# Patient Record
Sex: FEMALE | Race: WHITE | NOT HISPANIC OR LATINO | Employment: OTHER | URBAN - METROPOLITAN AREA
[De-identification: names, ages, dates, MRNs, and addresses within clinical notes are randomized per-mention and may not be internally consistent; named-entity substitution may affect disease eponyms.]

---

## 2017-03-16 ENCOUNTER — HISTORICAL (OUTPATIENT)
Dept: LAB | Facility: HOSPITAL | Age: 67
End: 2017-03-16

## 2019-05-06 ENCOUNTER — HISTORICAL (OUTPATIENT)
Dept: ADMINISTRATIVE | Facility: HOSPITAL | Age: 69
End: 2019-05-06

## 2019-05-06 LAB
ABS NEUT (OLG): 2.68 X10(3)/MCL (ref 2.1–9.2)
ALBUMIN SERPL-MCNC: 3.5 GM/DL (ref 3.4–5)
ALBUMIN/GLOB SERPL: 1.1 {RATIO}
ALP SERPL-CCNC: 88 UNIT/L (ref 38–126)
ALT SERPL-CCNC: 14 UNIT/L (ref 12–78)
AST SERPL-CCNC: 12 UNIT/L (ref 15–37)
BASOPHILS # BLD AUTO: 0 X10(3)/MCL (ref 0–0.2)
BASOPHILS NFR BLD AUTO: 0.6 %
BILIRUB SERPL-MCNC: 0.5 MG/DL (ref 0.2–1)
BILIRUBIN DIRECT+TOT PNL SERPL-MCNC: 0.1 MG/DL (ref 0–0.2)
BILIRUBIN DIRECT+TOT PNL SERPL-MCNC: 0.4 MG/DL (ref 0–0.8)
BUN SERPL-MCNC: 18 MG/DL (ref 7–18)
CALCIUM SERPL-MCNC: 8.8 MG/DL (ref 8.5–10.1)
CHLORIDE SERPL-SCNC: 106 MMOL/L (ref 98–107)
CO2 SERPL-SCNC: 27 MMOL/L (ref 21–32)
CREAT SERPL-MCNC: 1.01 MG/DL (ref 0.55–1.02)
EOSINOPHIL # BLD AUTO: 0.1 X10(3)/MCL (ref 0–0.9)
EOSINOPHIL NFR BLD AUTO: 2 %
ERYTHROCYTE [DISTWIDTH] IN BLOOD BY AUTOMATED COUNT: 14.6 % (ref 11.5–17)
GLOBULIN SER-MCNC: 3.3 GM/DL (ref 2.4–3.5)
GLUCOSE SERPL-MCNC: 89 MG/DL (ref 74–106)
HCT VFR BLD AUTO: 42.4 % (ref 37–47)
HGB BLD-MCNC: 13.5 GM/DL (ref 12–16)
LYMPHOCYTES # BLD AUTO: 1.7 X10(3)/MCL (ref 0.6–4.6)
LYMPHOCYTES NFR BLD AUTO: 34.9 %
MCH RBC QN AUTO: 29.2 PG (ref 27–31)
MCHC RBC AUTO-ENTMCNC: 31.8 GM/DL (ref 33–36)
MCV RBC AUTO: 91.8 FL (ref 80–94)
MONOCYTES # BLD AUTO: 0.4 X10(3)/MCL (ref 0.1–1.3)
MONOCYTES NFR BLD AUTO: 8.1 %
NEUTROPHILS # BLD AUTO: 2.7 X10(3)/MCL (ref 2.1–9.2)
NEUTROPHILS NFR BLD AUTO: 54.2 %
PLATELET # BLD AUTO: 268 X10(3)/MCL (ref 130–400)
PMV BLD AUTO: 9 FL (ref 9.4–12.4)
POTASSIUM SERPL-SCNC: 4.3 MMOL/L (ref 3.5–5.1)
PROT SERPL-MCNC: 6.8 GM/DL (ref 6.4–8.2)
RBC # BLD AUTO: 4.62 X10(6)/MCL (ref 4.2–5.4)
SODIUM SERPL-SCNC: 140 MMOL/L (ref 136–145)
WBC # SPEC AUTO: 5 X10(3)/MCL (ref 4.5–11.5)

## 2019-07-30 ENCOUNTER — HISTORICAL (OUTPATIENT)
Dept: ADMINISTRATIVE | Facility: HOSPITAL | Age: 69
End: 2019-07-30

## 2019-08-02 ENCOUNTER — HISTORICAL (OUTPATIENT)
Dept: RADIOLOGY | Facility: HOSPITAL | Age: 69
End: 2019-08-02

## 2019-08-06 ENCOUNTER — HISTORICAL (OUTPATIENT)
Dept: ADMINISTRATIVE | Facility: HOSPITAL | Age: 69
End: 2019-08-06

## 2019-08-06 LAB
ABS NEUT (OLG): 2.19 X10(3)/MCL (ref 2.1–9.2)
ALBUMIN SERPL-MCNC: 3.5 GM/DL (ref 3.4–5)
ALBUMIN/GLOB SERPL: 1 RATIO (ref 1.1–2)
ALP SERPL-CCNC: 95 UNIT/L (ref 38–126)
ALT SERPL-CCNC: 11 UNIT/L (ref 12–78)
AST SERPL-CCNC: 9 UNIT/L (ref 15–37)
BASOPHILS # BLD AUTO: 0 X10(3)/MCL (ref 0–0.2)
BASOPHILS NFR BLD AUTO: 0.6 %
BILIRUB SERPL-MCNC: 0.3 MG/DL (ref 0.2–1)
BILIRUBIN DIRECT+TOT PNL SERPL-MCNC: 0.1 MG/DL (ref 0–0.5)
BILIRUBIN DIRECT+TOT PNL SERPL-MCNC: 0.2 MG/DL (ref 0–0.8)
BUN SERPL-MCNC: 20 MG/DL (ref 7–18)
CALCIUM SERPL-MCNC: 9.2 MG/DL (ref 8.5–10.1)
CHLORIDE SERPL-SCNC: 109 MMOL/L (ref 98–107)
CO2 SERPL-SCNC: 26 MMOL/L (ref 21–32)
CREAT SERPL-MCNC: 0.75 MG/DL (ref 0.55–1.02)
EOSINOPHIL # BLD AUTO: 0.1 X10(3)/MCL (ref 0–0.9)
EOSINOPHIL NFR BLD AUTO: 1.2 %
ERYTHROCYTE [DISTWIDTH] IN BLOOD BY AUTOMATED COUNT: 13.1 % (ref 11.5–17)
GLOBULIN SER-MCNC: 3.6 GM/DL (ref 2.4–3.5)
GLUCOSE SERPL-MCNC: 95 MG/DL (ref 74–106)
HCT VFR BLD AUTO: 44.1 % (ref 37–47)
HGB BLD-MCNC: 13.1 GM/DL (ref 12–16)
LYMPHOCYTES # BLD AUTO: 2.3 X10(3)/MCL (ref 0.6–4.6)
LYMPHOCYTES NFR BLD AUTO: 45.7 %
MCH RBC QN AUTO: 27.1 PG (ref 27–31)
MCHC RBC AUTO-ENTMCNC: 29.7 GM/DL (ref 33–36)
MCV RBC AUTO: 91.1 FL (ref 80–94)
MONOCYTES # BLD AUTO: 0.5 X10(3)/MCL (ref 0.1–1.3)
MONOCYTES NFR BLD AUTO: 9.1 %
NEUTROPHILS # BLD AUTO: 2.2 X10(3)/MCL (ref 2.1–9.2)
NEUTROPHILS NFR BLD AUTO: 43.2 %
PLATELET # BLD AUTO: 281 X10(3)/MCL (ref 130–400)
PMV BLD AUTO: 9.7 FL (ref 9.4–12.4)
POTASSIUM SERPL-SCNC: 5 MMOL/L (ref 3.5–5.1)
PROT SERPL-MCNC: 7.1 GM/DL (ref 6.4–8.2)
RBC # BLD AUTO: 4.84 X10(6)/MCL (ref 4.2–5.4)
SODIUM SERPL-SCNC: 142 MMOL/L (ref 136–145)
WBC # SPEC AUTO: 5.1 X10(3)/MCL (ref 4.5–11.5)

## 2019-08-13 ENCOUNTER — HISTORICAL (OUTPATIENT)
Dept: INFUSION THERAPY | Facility: HOSPITAL | Age: 69
End: 2019-08-13

## 2019-09-03 ENCOUNTER — HISTORICAL (OUTPATIENT)
Dept: ADMINISTRATIVE | Facility: HOSPITAL | Age: 69
End: 2019-09-03

## 2019-09-03 LAB
BUN SERPL-MCNC: 16 MG/DL (ref 7–18)
CALCIUM SERPL-MCNC: 8.5 MG/DL (ref 8.5–10.1)
CHLORIDE SERPL-SCNC: 110 MMOL/L (ref 98–107)
CO2 SERPL-SCNC: 27 MMOL/L (ref 21–32)
CREAT SERPL-MCNC: 0.8 MG/DL (ref 0.55–1.02)
CREAT/UREA NIT SERPL: 20
ERYTHROCYTE [DISTWIDTH] IN BLOOD BY AUTOMATED COUNT: 14.6 % (ref 11.5–17)
GLUCOSE SERPL-MCNC: 115 MG/DL (ref 74–106)
HCT VFR BLD AUTO: 41.7 % (ref 37–47)
HGB BLD-MCNC: 12.3 GM/DL (ref 12–16)
MCH RBC QN AUTO: 26.3 PG (ref 27–31)
MCHC RBC AUTO-ENTMCNC: 29.5 GM/DL (ref 33–36)
MCV RBC AUTO: 89.1 FL (ref 80–94)
PLATELET # BLD AUTO: 413 X10(3)/MCL (ref 130–400)
PMV BLD AUTO: 9.3 FL (ref 9.4–12.4)
POTASSIUM SERPL-SCNC: 4.5 MMOL/L (ref 3.5–5.1)
RBC # BLD AUTO: 4.68 X10(6)/MCL (ref 4.2–5.4)
SODIUM SERPL-SCNC: 144 MMOL/L (ref 136–145)
WBC # SPEC AUTO: 5.8 X10(3)/MCL (ref 4.5–11.5)

## 2019-11-05 ENCOUNTER — HISTORICAL (OUTPATIENT)
Dept: ADMINISTRATIVE | Facility: HOSPITAL | Age: 69
End: 2019-11-05

## 2019-11-05 LAB
ABS NEUT (OLG): 3.2 X10(3)/MCL (ref 2.1–9.2)
ALBUMIN SERPL-MCNC: 3.5 GM/DL (ref 3.4–5)
ALBUMIN/GLOB SERPL: 1.1 RATIO (ref 1.1–2)
ALP SERPL-CCNC: 59 UNIT/L (ref 38–126)
ALT SERPL-CCNC: 10 UNIT/L (ref 12–78)
AST SERPL-CCNC: 11 UNIT/L (ref 15–37)
BASOPHILS # BLD AUTO: 0 X10(3)/MCL (ref 0–0.2)
BASOPHILS NFR BLD AUTO: 0.5 %
BILIRUB SERPL-MCNC: 0.3 MG/DL (ref 0.2–1)
BILIRUBIN DIRECT+TOT PNL SERPL-MCNC: 0 MG/DL (ref 0–0.5)
BILIRUBIN DIRECT+TOT PNL SERPL-MCNC: 0.3 MG/DL (ref 0–0.8)
BUN SERPL-MCNC: 14 MG/DL (ref 7–18)
CALCIUM SERPL-MCNC: 8.7 MG/DL (ref 8.5–10.1)
CHLORIDE SERPL-SCNC: 109 MMOL/L (ref 98–107)
CO2 SERPL-SCNC: 29 MMOL/L (ref 21–32)
CREAT SERPL-MCNC: 0.63 MG/DL (ref 0.55–1.02)
EOSINOPHIL # BLD AUTO: 0 X10(3)/MCL (ref 0–0.9)
EOSINOPHIL NFR BLD AUTO: 0.7 %
ERYTHROCYTE [DISTWIDTH] IN BLOOD BY AUTOMATED COUNT: 17.2 % (ref 11.5–17)
GLOBULIN SER-MCNC: 3.1 GM/DL (ref 2.4–3.5)
GLUCOSE SERPL-MCNC: 92 MG/DL (ref 74–106)
HCT VFR BLD AUTO: 43.2 % (ref 37–47)
HGB BLD-MCNC: 13.4 GM/DL (ref 12–16)
LYMPHOCYTES # BLD AUTO: 2 X10(3)/MCL (ref 0.6–4.6)
LYMPHOCYTES NFR BLD AUTO: 34.7 %
MCH RBC QN AUTO: 28.2 PG (ref 27–31)
MCHC RBC AUTO-ENTMCNC: 31 GM/DL (ref 33–36)
MCV RBC AUTO: 90.9 FL (ref 80–94)
MONOCYTES # BLD AUTO: 0.5 X10(3)/MCL (ref 0.1–1.3)
MONOCYTES NFR BLD AUTO: 8.1 %
NEUTROPHILS # BLD AUTO: 3.2 X10(3)/MCL (ref 2.1–9.2)
NEUTROPHILS NFR BLD AUTO: 55.5 %
PLATELET # BLD AUTO: 301 X10(3)/MCL (ref 130–400)
PMV BLD AUTO: 9 FL (ref 9.4–12.4)
POTASSIUM SERPL-SCNC: 5.6 MMOL/L (ref 3.5–5.1)
PROT SERPL-MCNC: 6.6 GM/DL (ref 6.4–8.2)
RBC # BLD AUTO: 4.75 X10(6)/MCL (ref 4.2–5.4)
SODIUM SERPL-SCNC: 143 MMOL/L (ref 136–145)
WBC # SPEC AUTO: 5.8 X10(3)/MCL (ref 4.5–11.5)

## 2020-02-04 ENCOUNTER — HISTORICAL (OUTPATIENT)
Dept: ADMINISTRATIVE | Facility: HOSPITAL | Age: 70
End: 2020-02-04

## 2020-02-04 LAB
ABS NEUT (OLG): 1.88 X10(3)/MCL (ref 2.1–9.2)
ALBUMIN SERPL-MCNC: 3.9 GM/DL (ref 3.4–5)
ALP SERPL-CCNC: 68 UNIT/L (ref 38–126)
ALT SERPL-CCNC: 11 UNIT/L (ref 12–78)
ANION GAP SERPL CALC-SCNC: 12 MMOL/L
AST SERPL-CCNC: 8 UNIT/L (ref 15–37)
BASOPHILS # BLD AUTO: 0 X10(3)/MCL (ref 0–0.2)
BASOPHILS NFR BLD AUTO: 0.7 %
BILIRUB SERPL-MCNC: 0.5 MG/DL (ref 0.2–1)
BILIRUBIN DIRECT+TOT PNL SERPL-MCNC: 0.1 MG/DL (ref 0–0.2)
BILIRUBIN DIRECT+TOT PNL SERPL-MCNC: 0.4 MG/DL (ref 0–0.8)
BUN SERPL-MCNC: 16 MG/DL (ref 8–26)
CHLORIDE SERPL-SCNC: 106 MMOL/L (ref 98–109)
CREAT SERPL-MCNC: 0.6 MG/DL (ref 0.6–1.3)
EOSINOPHIL # BLD AUTO: 0.1 X10(3)/MCL (ref 0–0.9)
EOSINOPHIL NFR BLD AUTO: 1.3 %
ERYTHROCYTE [DISTWIDTH] IN BLOOD BY AUTOMATED COUNT: 13 % (ref 11.5–17)
GLUCOSE SERPL-MCNC: 89 MG/DL (ref 70–105)
HCT VFR BLD AUTO: 45.1 % (ref 37–47)
HCT VFR BLD CALC: 44 % (ref 38–51)
HGB BLD-MCNC: 14.5 GM/DL (ref 12–16)
HGB BLD-MCNC: 15 MG/DL (ref 12–17)
LIVER PROFILE INTERP: ABNORMAL
LYMPHOCYTES # BLD AUTO: 2.2 X10(3)/MCL (ref 0.6–4.6)
LYMPHOCYTES NFR BLD AUTO: 47.8 %
MCH RBC QN AUTO: 29.8 PG (ref 27–31)
MCHC RBC AUTO-ENTMCNC: 32.2 GM/DL (ref 33–36)
MCV RBC AUTO: 92.8 FL (ref 80–94)
MONOCYTES # BLD AUTO: 0.4 X10(3)/MCL (ref 0.1–1.3)
MONOCYTES NFR BLD AUTO: 8.2 %
NEUTROPHILS # BLD AUTO: 1.9 X10(3)/MCL (ref 2.1–9.2)
NEUTROPHILS NFR BLD AUTO: 41.8 %
PLATELET # BLD AUTO: 252 X10(3)/MCL (ref 130–400)
PMV BLD AUTO: 8.9 FL (ref 9.4–12.4)
POC IONIZED CALCIUM: 1.22 MMOL/L (ref 1.12–1.32)
POC TCO2: 26 MMOL/L (ref 24–29)
POTASSIUM BLD-SCNC: 4.6 MMOL/L (ref 3.5–4.9)
PROT SERPL-MCNC: 7.3 GM/DL (ref 6.4–8.2)
RBC # BLD AUTO: 4.86 X10(6)/MCL (ref 4.2–5.4)
SODIUM BLD-SCNC: 139 MMOL/L (ref 138–146)
WBC # SPEC AUTO: 4.5 X10(3)/MCL (ref 4.5–11.5)

## 2020-02-13 ENCOUNTER — HISTORICAL (OUTPATIENT)
Dept: INFUSION THERAPY | Facility: HOSPITAL | Age: 70
End: 2020-02-13

## 2020-05-19 ENCOUNTER — HISTORICAL (OUTPATIENT)
Dept: ADMINISTRATIVE | Facility: HOSPITAL | Age: 70
End: 2020-05-19

## 2020-06-30 ENCOUNTER — HISTORICAL (OUTPATIENT)
Dept: ADMINISTRATIVE | Facility: HOSPITAL | Age: 70
End: 2020-06-30

## 2020-08-14 ENCOUNTER — HISTORICAL (OUTPATIENT)
Dept: INFUSION THERAPY | Facility: HOSPITAL | Age: 70
End: 2020-08-14

## 2020-08-14 LAB
ABS NEUT (OLG): 2.31 X10(3)/MCL (ref 2.1–9.2)
ALBUMIN SERPL-MCNC: 3.6 GM/DL (ref 3.4–5)
ALBUMIN/GLOB SERPL: 1.1 RATIO (ref 1.1–2)
ALP SERPL-CCNC: 67 UNIT/L (ref 40–150)
ALT SERPL-CCNC: 6 UNIT/L (ref 0–55)
AST SERPL-CCNC: 11 UNIT/L (ref 5–34)
BASOPHILS # BLD AUTO: 0 X10(3)/MCL (ref 0–0.2)
BASOPHILS NFR BLD AUTO: 0.4 %
BILIRUB SERPL-MCNC: 0.5 MG/DL
BILIRUBIN DIRECT+TOT PNL SERPL-MCNC: 0.2 MG/DL (ref 0–0.5)
BILIRUBIN DIRECT+TOT PNL SERPL-MCNC: 0.3 MG/DL (ref 0–0.8)
BUN SERPL-MCNC: 15.2 MG/DL (ref 9.8–20.1)
CALCIUM SERPL-MCNC: 8.5 MG/DL (ref 8.4–10.2)
CHLORIDE SERPL-SCNC: 105 MMOL/L (ref 98–107)
CO2 SERPL-SCNC: 24 MMOL/L (ref 23–31)
CREAT SERPL-MCNC: 0.72 MG/DL (ref 0.55–1.02)
EOSINOPHIL # BLD AUTO: 0.1 X10(3)/MCL (ref 0–0.9)
EOSINOPHIL NFR BLD AUTO: 1.4 %
ERYTHROCYTE [DISTWIDTH] IN BLOOD BY AUTOMATED COUNT: 13.1 % (ref 11.5–17)
GLOBULIN SER-MCNC: 3.2 GM/DL (ref 2.4–3.5)
GLUCOSE SERPL-MCNC: 84 MG/DL (ref 82–115)
HCT VFR BLD AUTO: 43.4 % (ref 37–47)
HGB BLD-MCNC: 13.5 GM/DL (ref 12–16)
LYMPHOCYTES # BLD AUTO: 2 X10(3)/MCL (ref 0.6–4.6)
LYMPHOCYTES NFR BLD AUTO: 41.6 %
MCH RBC QN AUTO: 29.3 PG (ref 27–31)
MCHC RBC AUTO-ENTMCNC: 31.1 GM/DL (ref 33–36)
MCV RBC AUTO: 94.1 FL (ref 80–94)
MONOCYTES # BLD AUTO: 0.4 X10(3)/MCL (ref 0.1–1.3)
MONOCYTES NFR BLD AUTO: 8.5 %
NEUTROPHILS # BLD AUTO: 2.3 X10(3)/MCL (ref 2.1–9.2)
NEUTROPHILS NFR BLD AUTO: 47.9 %
PLATELET # BLD AUTO: 270 X10(3)/MCL (ref 130–400)
PMV BLD AUTO: 8.9 FL (ref 9.4–12.4)
POTASSIUM SERPL-SCNC: 4.7 MMOL/L (ref 3.5–5.1)
PROT SERPL-MCNC: 6.8 GM/DL (ref 5.8–7.6)
RBC # BLD AUTO: 4.61 X10(6)/MCL (ref 4.2–5.4)
SODIUM SERPL-SCNC: 141 MMOL/L (ref 136–145)
WBC # SPEC AUTO: 4.8 X10(3)/MCL (ref 4.5–11.5)

## 2020-09-29 ENCOUNTER — HISTORICAL (OUTPATIENT)
Dept: ADMINISTRATIVE | Facility: HOSPITAL | Age: 70
End: 2020-09-29

## 2020-11-18 ENCOUNTER — HISTORICAL (OUTPATIENT)
Dept: HEMATOLOGY/ONCOLOGY | Facility: CLINIC | Age: 70
End: 2020-11-18

## 2020-11-18 LAB
ABS NEUT (OLG): 2.53 X10(3)/MCL (ref 2.1–9.2)
ALBUMIN SERPL-MCNC: 3.6 GM/DL (ref 3.4–4.8)
ALP SERPL-CCNC: 70 UNIT/L (ref 40–150)
ALT SERPL-CCNC: 8 UNIT/L (ref 0–55)
ANION GAP SERPL CALC-SCNC: 17 MMOL/L
AST SERPL-CCNC: 12 UNIT/L (ref 5–34)
BASOPHILS # BLD AUTO: 0 X10(3)/MCL (ref 0–0.2)
BASOPHILS NFR BLD AUTO: 0.4 %
BILIRUB SERPL-MCNC: 0.5 MG/DL
BILIRUBIN DIRECT+TOT PNL SERPL-MCNC: 0.2 MG/DL (ref 0–0.5)
BILIRUBIN DIRECT+TOT PNL SERPL-MCNC: 0.3 MG/DL (ref 0–0.8)
BUN SERPL-MCNC: 18 MG/DL (ref 8–26)
CHLORIDE SERPL-SCNC: 104 MMOL/L (ref 98–109)
CREAT SERPL-MCNC: 0.7 MG/DL (ref 0.6–1.3)
EOSINOPHIL # BLD AUTO: 0 X10(3)/MCL (ref 0–0.9)
EOSINOPHIL NFR BLD AUTO: 1.1 %
ERYTHROCYTE [DISTWIDTH] IN BLOOD BY AUTOMATED COUNT: 13 % (ref 11.5–17)
GLUCOSE SERPL-MCNC: 127 MG/DL (ref 70–105)
HCT VFR BLD AUTO: 44.2 % (ref 37–47)
HCT VFR BLD CALC: 41 % (ref 38–51)
HGB BLD-MCNC: 13.9 MG/DL (ref 12–17)
HGB BLD-MCNC: 14.3 GM/DL (ref 12–16)
LIVER PROFILE INTERP: NORMAL
LYMPHOCYTES # BLD AUTO: 1.5 X10(3)/MCL (ref 0.6–4.6)
LYMPHOCYTES NFR BLD AUTO: 34.2 %
MCH RBC QN AUTO: 29.5 PG (ref 27–31)
MCHC RBC AUTO-ENTMCNC: 32.4 GM/DL (ref 33–36)
MCV RBC AUTO: 91.1 FL (ref 80–94)
MONOCYTES # BLD AUTO: 0.4 X10(3)/MCL (ref 0.1–1.3)
MONOCYTES NFR BLD AUTO: 7.8 %
NEUTROPHILS # BLD AUTO: 2.5 X10(3)/MCL (ref 2.1–9.2)
NEUTROPHILS NFR BLD AUTO: 56.3 %
PLATELET # BLD AUTO: 273 X10(3)/MCL (ref 130–400)
PMV BLD AUTO: 8.9 FL (ref 9.4–12.4)
POC IONIZED CALCIUM: 1.14 MMOL/L (ref 1.12–1.32)
POC TCO2: 27 MMOL/L (ref 24–29)
POTASSIUM BLD-SCNC: 4.6 MMOL/L (ref 3.5–4.9)
PROT SERPL-MCNC: 7.3 GM/DL (ref 5.8–7.6)
RBC # BLD AUTO: 4.85 X10(6)/MCL (ref 4.2–5.4)
SODIUM BLD-SCNC: 143 MMOL/L (ref 138–146)
WBC # SPEC AUTO: 4.5 X10(3)/MCL (ref 4.5–11.5)

## 2021-02-24 ENCOUNTER — HISTORICAL (OUTPATIENT)
Dept: ADMINISTRATIVE | Facility: HOSPITAL | Age: 71
End: 2021-02-24

## 2021-02-24 LAB
ABS NEUT (OLG): 2.95 X10(3)/MCL (ref 2.1–9.2)
ALBUMIN SERPL-MCNC: 3.4 GM/DL (ref 3.4–4.8)
ALBUMIN/GLOB SERPL: 1.1 RATIO (ref 1.1–2)
ALP SERPL-CCNC: 77 UNIT/L (ref 40–150)
ALT SERPL-CCNC: 7 UNIT/L (ref 0–55)
AST SERPL-CCNC: 11 UNIT/L (ref 5–34)
BASOPHILS # BLD AUTO: 0 X10(3)/MCL (ref 0–0.2)
BASOPHILS NFR BLD AUTO: 0.6 %
BILIRUB SERPL-MCNC: 0.3 MG/DL
BILIRUBIN DIRECT+TOT PNL SERPL-MCNC: 0.1 MG/DL (ref 0–0.5)
BILIRUBIN DIRECT+TOT PNL SERPL-MCNC: 0.2 MG/DL (ref 0–0.8)
BUN SERPL-MCNC: 18.5 MG/DL (ref 9.8–20.1)
CALCIUM SERPL-MCNC: 8.9 MG/DL (ref 8.4–10.2)
CHLORIDE SERPL-SCNC: 104 MMOL/L (ref 98–107)
CO2 SERPL-SCNC: 29 MMOL/L (ref 23–31)
CREAT SERPL-MCNC: 0.8 MG/DL (ref 0.55–1.02)
EOSINOPHIL # BLD AUTO: 0.1 X10(3)/MCL (ref 0–0.9)
EOSINOPHIL NFR BLD AUTO: 2.5 %
ERYTHROCYTE [DISTWIDTH] IN BLOOD BY AUTOMATED COUNT: 12.9 % (ref 11.5–17)
GLOBULIN SER-MCNC: 3.2 GM/DL (ref 2.4–3.5)
GLUCOSE SERPL-MCNC: 108 MG/DL (ref 82–115)
HCT VFR BLD AUTO: 43.1 % (ref 37–47)
HGB BLD-MCNC: 13.7 GM/DL (ref 12–16)
LYMPHOCYTES # BLD AUTO: 1.6 X10(3)/MCL (ref 0.6–4.6)
LYMPHOCYTES NFR BLD AUTO: 31.3 %
MCH RBC QN AUTO: 29.7 PG (ref 27–31)
MCHC RBC AUTO-ENTMCNC: 31.8 GM/DL (ref 33–36)
MCV RBC AUTO: 93.5 FL (ref 80–94)
MONOCYTES # BLD AUTO: 0.4 X10(3)/MCL (ref 0.1–1.3)
MONOCYTES NFR BLD AUTO: 8 %
NEUTROPHILS # BLD AUTO: 3 X10(3)/MCL (ref 2.1–9.2)
NEUTROPHILS NFR BLD AUTO: 57.2 %
PLATELET # BLD AUTO: 241 X10(3)/MCL (ref 130–400)
PMV BLD AUTO: 9 FL (ref 9.4–12.4)
POTASSIUM SERPL-SCNC: 4.7 MMOL/L (ref 3.5–5.1)
PROT SERPL-MCNC: 6.6 GM/DL (ref 5.8–7.6)
RBC # BLD AUTO: 4.61 X10(6)/MCL (ref 4.2–5.4)
SODIUM SERPL-SCNC: 140 MMOL/L (ref 136–145)
WBC # SPEC AUTO: 5.2 X10(3)/MCL (ref 4.5–11.5)

## 2021-03-08 ENCOUNTER — HISTORICAL (OUTPATIENT)
Dept: INFUSION THERAPY | Facility: HOSPITAL | Age: 71
End: 2021-03-08

## 2021-08-19 ENCOUNTER — HISTORICAL (OUTPATIENT)
Dept: ADMINISTRATIVE | Facility: HOSPITAL | Age: 71
End: 2021-08-19

## 2021-09-13 ENCOUNTER — HISTORICAL (OUTPATIENT)
Dept: HEMATOLOGY/ONCOLOGY | Facility: CLINIC | Age: 71
End: 2021-09-13

## 2021-09-13 LAB
ABS NEUT (OLG): 2.37 X10(3)/MCL (ref 2.1–9.2)
ALBUMIN SERPL-MCNC: 3.7 GM/DL (ref 3.4–4.8)
ALBUMIN/GLOB SERPL: 1.2 RATIO (ref 1.1–2)
ALP SERPL-CCNC: 73 UNIT/L (ref 40–150)
ALT SERPL-CCNC: 6 UNIT/L (ref 0–55)
AST SERPL-CCNC: 14 UNIT/L (ref 5–34)
BASOPHILS # BLD AUTO: 0 X10(3)/MCL (ref 0–0.2)
BASOPHILS NFR BLD AUTO: 0.4 %
BILIRUB SERPL-MCNC: 0.4 MG/DL
BILIRUBIN DIRECT+TOT PNL SERPL-MCNC: 0.2 MG/DL (ref 0–0.5)
BILIRUBIN DIRECT+TOT PNL SERPL-MCNC: 0.2 MG/DL (ref 0–0.8)
BUN SERPL-MCNC: 18.4 MG/DL (ref 9.8–20.1)
CALCIUM SERPL-MCNC: 9.1 MG/DL (ref 8.4–10.2)
CHLORIDE SERPL-SCNC: 105 MMOL/L (ref 98–107)
CO2 SERPL-SCNC: 27 MMOL/L (ref 23–31)
CREAT SERPL-MCNC: 0.75 MG/DL (ref 0.55–1.02)
EOSINOPHIL # BLD AUTO: 0.1 X10(3)/MCL (ref 0–0.9)
EOSINOPHIL NFR BLD AUTO: 1.5 %
ERYTHROCYTE [DISTWIDTH] IN BLOOD BY AUTOMATED COUNT: 13.2 % (ref 11.5–17)
GLOBULIN SER-MCNC: 3 GM/DL (ref 2.4–3.5)
GLUCOSE SERPL-MCNC: 95 MG/DL (ref 82–115)
HCT VFR BLD AUTO: 43.5 % (ref 37–47)
HGB BLD-MCNC: 13.8 GM/DL (ref 12–16)
LYMPHOCYTES # BLD AUTO: 1.8 X10(3)/MCL (ref 0.6–4.6)
LYMPHOCYTES NFR BLD AUTO: 38.1 %
MCH RBC QN AUTO: 29.7 PG (ref 27–31)
MCHC RBC AUTO-ENTMCNC: 31.7 GM/DL (ref 33–36)
MCV RBC AUTO: 93.8 FL (ref 80–94)
MONOCYTES # BLD AUTO: 0.4 X10(3)/MCL (ref 0.1–1.3)
MONOCYTES NFR BLD AUTO: 9 %
NEUTROPHILS # BLD AUTO: 2.4 X10(3)/MCL (ref 2.1–9.2)
NEUTROPHILS NFR BLD AUTO: 50.8 %
PLATELET # BLD AUTO: 244 X10(3)/MCL (ref 130–400)
PMV BLD AUTO: 9.2 FL (ref 9.4–12.4)
POTASSIUM SERPL-SCNC: 5.4 MMOL/L (ref 3.5–5.1)
PROT SERPL-MCNC: 6.7 GM/DL (ref 5.8–7.6)
RBC # BLD AUTO: 4.64 X10(6)/MCL (ref 4.2–5.4)
SODIUM SERPL-SCNC: 140 MMOL/L (ref 136–145)
WBC # SPEC AUTO: 4.7 X10(3)/MCL (ref 4.5–11.5)

## 2021-09-27 ENCOUNTER — HISTORICAL (OUTPATIENT)
Dept: INFUSION THERAPY | Facility: HOSPITAL | Age: 71
End: 2021-09-27

## 2021-09-27 ENCOUNTER — HISTORICAL (OUTPATIENT)
Dept: RADIOLOGY | Facility: HOSPITAL | Age: 71
End: 2021-09-27

## 2022-03-28 ENCOUNTER — HISTORICAL (OUTPATIENT)
Dept: INFUSION THERAPY | Facility: HOSPITAL | Age: 72
End: 2022-03-28

## 2022-04-14 ENCOUNTER — HISTORICAL (OUTPATIENT)
Dept: ADMINISTRATIVE | Facility: HOSPITAL | Age: 72
End: 2022-04-14

## 2022-04-14 LAB
ABS NEUT (OLG): 2.73 (ref 2.1–9.2)
ALBUMIN SERPL-MCNC: 3.4 G/DL (ref 3.4–4.8)
ALBUMIN/GLOB SERPL: 1.1 {RATIO} (ref 1.1–2)
ALP SERPL-CCNC: 76 U/L (ref 40–150)
ALT SERPL-CCNC: 8 U/L (ref 0–55)
AST SERPL-CCNC: 13 U/L (ref 5–34)
BASOPHILS # BLD AUTO: 0 10*3/UL (ref 0–0.2)
BASOPHILS NFR BLD AUTO: 0.4 %
BILIRUB SERPL-MCNC: 0.5 MG/DL
BILIRUBIN DIRECT+TOT PNL SERPL-MCNC: 0.2 (ref 0–0.5)
BILIRUBIN DIRECT+TOT PNL SERPL-MCNC: 0.3 (ref 0–0.8)
BUN SERPL-MCNC: 14.4 MG/DL (ref 9.8–20.1)
CALCIUM SERPL-MCNC: 9 MG/DL (ref 8.7–10.5)
CHLORIDE SERPL-SCNC: 108 MMOL/L (ref 98–107)
CO2 SERPL-SCNC: 25 MMOL/L (ref 23–31)
CREAT SERPL-MCNC: 0.78 MG/DL (ref 0.55–1.02)
EOSINOPHIL # BLD AUTO: 0.1 10*3/UL (ref 0–0.9)
EOSINOPHIL NFR BLD AUTO: 2.3 %
ERYTHROCYTE [DISTWIDTH] IN BLOOD BY AUTOMATED COUNT: 12.9 % (ref 11.5–17)
GLOBULIN SER-MCNC: 3.2 G/DL (ref 2.4–3.5)
GLUCOSE SERPL-MCNC: 85 MG/DL (ref 82–115)
HCT VFR BLD AUTO: 42.8 % (ref 37–47)
HEMOLYSIS INTERF INDEX SERPL-ACNC: 18
HGB BLD-MCNC: 13.4 G/DL (ref 12–16)
ICTERIC INTERF INDEX SERPL-ACNC: 1
LIPEMIC INTERF INDEX SERPL-ACNC: 8
LYMPHOCYTES # BLD AUTO: 1.9 10*3/UL (ref 0.6–4.6)
LYMPHOCYTES NFR BLD AUTO: 36 %
MANUAL DIFF? (OHS): NO
MCH RBC QN AUTO: 29.6 PG (ref 27–31)
MCHC RBC AUTO-ENTMCNC: 31.3 G/DL (ref 33–36)
MCV RBC AUTO: 94.7 FL (ref 80–94)
MONOCYTES # BLD AUTO: 0.5 10*3/UL (ref 0.1–1.3)
MONOCYTES NFR BLD AUTO: 9 %
NEUTROPHILS # BLD AUTO: 2.7 10*3/UL (ref 2.1–9.2)
NEUTROPHILS NFR BLD AUTO: 51.9 %
PLATELET # BLD AUTO: 248 10*3/UL (ref 130–400)
PMV BLD AUTO: 9.4 FL (ref 9.4–12.4)
POTASSIUM SERPL-SCNC: 4.7 MMOL/L (ref 3.5–5.1)
PROT SERPL-MCNC: 6.6 G/DL (ref 5.8–7.6)
RBC # BLD AUTO: 4.52 10*6/UL (ref 4.2–5.4)
SODIUM SERPL-SCNC: 140 MMOL/L (ref 136–145)
WBC # SPEC AUTO: 5.2 10*3/UL (ref 4.5–11.5)

## 2022-06-30 ENCOUNTER — OFFICE VISIT (OUTPATIENT)
Dept: ORTHOPEDICS | Facility: CLINIC | Age: 72
End: 2022-06-30
Payer: MEDICARE

## 2022-06-30 ENCOUNTER — HOSPITAL ENCOUNTER (OUTPATIENT)
Dept: RADIOLOGY | Facility: CLINIC | Age: 72
Discharge: HOME | End: 2022-06-30
Attending: ORTHOPAEDIC SURGERY
Payer: MEDICARE

## 2022-06-30 VITALS
SYSTOLIC BLOOD PRESSURE: 113 MMHG | DIASTOLIC BLOOD PRESSURE: 72 MMHG | HEIGHT: 69 IN | WEIGHT: 114 LBS | BODY MASS INDEX: 16.88 KG/M2 | HEART RATE: 86 BPM

## 2022-06-30 DIAGNOSIS — M16.11 PRIMARY OSTEOARTHRITIS OF RIGHT HIP: ICD-10-CM

## 2022-06-30 DIAGNOSIS — S73.191S TEAR OF RIGHT ACETABULAR LABRUM, SEQUELA: Primary | ICD-10-CM

## 2022-06-30 PROCEDURE — 3078F PR MOST RECENT DIASTOLIC BLOOD PRESSURE < 80 MM HG: ICD-10-PCS | Mod: CPTII,,, | Performed by: ORTHOPAEDIC SURGERY

## 2022-06-30 PROCEDURE — 3288F PR FALLS RISK ASSESSMENT DOCUMENTED: ICD-10-PCS | Mod: CPTII,,, | Performed by: ORTHOPAEDIC SURGERY

## 2022-06-30 PROCEDURE — 3008F BODY MASS INDEX DOCD: CPT | Mod: CPTII,,, | Performed by: ORTHOPAEDIC SURGERY

## 2022-06-30 PROCEDURE — 73502 XR HIP WITH PELVIS WHEN PERFORMED, 2 OR 3  VIEWS RIGHT: ICD-10-PCS | Mod: RT,,, | Performed by: ORTHOPAEDIC SURGERY

## 2022-06-30 PROCEDURE — 3008F PR BODY MASS INDEX (BMI) DOCUMENTED: ICD-10-PCS | Mod: CPTII,,, | Performed by: ORTHOPAEDIC SURGERY

## 2022-06-30 PROCEDURE — 1159F PR MEDICATION LIST DOCUMENTED IN MEDICAL RECORD: ICD-10-PCS | Mod: CPTII,,, | Performed by: ORTHOPAEDIC SURGERY

## 2022-06-30 PROCEDURE — 3074F SYST BP LT 130 MM HG: CPT | Mod: CPTII,,, | Performed by: ORTHOPAEDIC SURGERY

## 2022-06-30 PROCEDURE — 3078F DIAST BP <80 MM HG: CPT | Mod: CPTII,,, | Performed by: ORTHOPAEDIC SURGERY

## 2022-06-30 PROCEDURE — 99213 PR OFFICE/OUTPT VISIT, EST, LEVL III, 20-29 MIN: ICD-10-PCS | Mod: ,,, | Performed by: ORTHOPAEDIC SURGERY

## 2022-06-30 PROCEDURE — 3074F PR MOST RECENT SYSTOLIC BLOOD PRESSURE < 130 MM HG: ICD-10-PCS | Mod: CPTII,,, | Performed by: ORTHOPAEDIC SURGERY

## 2022-06-30 PROCEDURE — 99213 OFFICE O/P EST LOW 20 MIN: CPT | Mod: ,,, | Performed by: ORTHOPAEDIC SURGERY

## 2022-06-30 PROCEDURE — 3288F FALL RISK ASSESSMENT DOCD: CPT | Mod: CPTII,,, | Performed by: ORTHOPAEDIC SURGERY

## 2022-06-30 PROCEDURE — 1159F MED LIST DOCD IN RCRD: CPT | Mod: CPTII,,, | Performed by: ORTHOPAEDIC SURGERY

## 2022-06-30 PROCEDURE — 1101F PT FALLS ASSESS-DOCD LE1/YR: CPT | Mod: CPTII,,, | Performed by: ORTHOPAEDIC SURGERY

## 2022-06-30 PROCEDURE — 73502 X-RAY EXAM HIP UNI 2-3 VIEWS: CPT | Mod: RT,,, | Performed by: ORTHOPAEDIC SURGERY

## 2022-06-30 PROCEDURE — 1101F PR PT FALLS ASSESS DOC 0-1 FALLS W/OUT INJ PAST YR: ICD-10-PCS | Mod: CPTII,,, | Performed by: ORTHOPAEDIC SURGERY

## 2022-06-30 RX ORDER — AZITHROMYCIN 250 MG/1
TABLET, FILM COATED ORAL
Status: ON HOLD | COMMUNITY
Start: 2022-06-20 | End: 2022-08-03 | Stop reason: CLARIF

## 2022-06-30 RX ORDER — TRIAMTERENE AND HYDROCHLOROTHIAZIDE 37.5; 25 MG/1; MG/1
1 CAPSULE ORAL DAILY
Status: ON HOLD | COMMUNITY
Start: 2022-06-24 | End: 2022-08-03 | Stop reason: CLARIF

## 2022-06-30 RX ORDER — TRAMADOL HYDROCHLORIDE 50 MG/1
50 TABLET ORAL 4 TIMES DAILY PRN
Status: ON HOLD | COMMUNITY
Start: 2022-06-13 | End: 2022-08-04 | Stop reason: HOSPADM

## 2022-06-30 RX ORDER — LETROZOLE 2.5 MG/1
2.5 TABLET, FILM COATED ORAL NIGHTLY
COMMUNITY
Start: 2021-09-16 | End: 2023-06-12

## 2022-06-30 RX ORDER — METHYLPREDNISOLONE 4 MG/1
TABLET ORAL
Status: ON HOLD | COMMUNITY
Start: 2022-06-20 | End: 2022-08-03 | Stop reason: CLARIF

## 2022-06-30 RX ORDER — VITAMIN E 268 MG
400 CAPSULE ORAL
COMMUNITY

## 2022-06-30 RX ORDER — ROPINIROLE 1 MG/1
2 TABLET, FILM COATED ORAL NIGHTLY
COMMUNITY
Start: 2022-06-13

## 2022-06-30 RX ORDER — METOPROLOL SUCCINATE 25 MG/1
25 TABLET, EXTENDED RELEASE ORAL NIGHTLY
COMMUNITY
Start: 2022-06-06

## 2022-06-30 RX ORDER — ASPIRIN 325 MG
325 TABLET, DELAYED RELEASE (ENTERIC COATED) ORAL
Status: ON HOLD | COMMUNITY
End: 2022-08-03 | Stop reason: CLARIF

## 2022-06-30 RX ORDER — RIVAROXABAN 20 MG/1
20 TABLET, FILM COATED ORAL DAILY
COMMUNITY
Start: 2022-06-13

## 2022-06-30 RX ORDER — PREGABALIN 50 MG/1
50 CAPSULE ORAL NIGHTLY
COMMUNITY
Start: 2021-08-19 | End: 2024-01-22

## 2022-06-30 RX ORDER — BISACODYL 5 MG
10 TABLET, DELAYED RELEASE (ENTERIC COATED) ORAL DAILY PRN
COMMUNITY

## 2022-06-30 RX ORDER — AMITRIPTYLINE HYDROCHLORIDE 50 MG/1
50 TABLET, FILM COATED ORAL NIGHTLY
COMMUNITY
Start: 2022-06-17

## 2022-06-30 RX ORDER — TIZANIDINE 4 MG/1
4 TABLET ORAL NIGHTLY PRN
COMMUNITY
Start: 2022-06-13

## 2022-06-30 NOTE — PROGRESS NOTES
Past Medical History:   Diagnosis Date    A-fib     Asthma     Breast cancer     Fibromyalgia     Osteoarthritis        Past Surgical History:   Procedure Laterality Date    APPENDECTOMY      MASTECTOMY Bilateral     TOTAL HIP ARTHROPLASTY Left     PATRICK GIRON       Current Outpatient Medications   Medication Sig    amitriptyline (ELAVIL) 50 MG tablet Take 50 mg by mouth nightly.    aspirin (ECOTRIN) 325 MG EC tablet Take 325 mg by mouth.    azithromycin (Z-VANESSA) 250 MG tablet TAKE 2 TABLETS BY MOUTH TODAY, THEN TAKE 1 TABLET DAILY FOR 4 DAYS    bisacodyL (DULCOLAX) 5 mg EC tablet Take 10 mg by mouth daily as needed.    letrozole (FEMARA) 2.5 mg Tab Take 2.5 mg by mouth.    methylPREDNISolone (MEDROL DOSEPACK) 4 mg tablet TAKE 6 TABLETS ON DAY 1 AS DIRECTED ON PACKAGE AND DECREASE BY 1 TAB EACH DAY FOR A TOTAL OF 6 DAYS    metoprolol succinate (TOPROL-XL) 25 MG 24 hr tablet Take 25 mg by mouth once daily.    pregabalin (LYRICA) 50 MG capsule Take 1 capsule by mouth every evening.    rOPINIRole (REQUIP) 1 MG tablet Take 2 mg by mouth nightly.    tiZANidine (ZANAFLEX) 4 MG tablet Take 4 mg by mouth daily as needed.    traMADoL (ULTRAM) 50 mg tablet Take 50 mg by mouth 4 (four) times daily as needed.    triamterene-hydrochlorothiazide 37.5-25 mg (DYAZIDE) 37.5-25 mg per capsule Take 1 capsule by mouth once daily.    vitamin E 400 UNIT capsule Take 400 Units by mouth.    XARELTO 20 mg Tab Take 20 mg by mouth once daily.     No current facility-administered medications for this visit.       Review of patient's allergies indicates:   Allergen Reactions    Loratadine-pseudoephedrine Rash and Shortness Of Breath     Other reaction(s): hives, pruritis       No family history on file.    Social History     Socioeconomic History    Marital status:    Tobacco Use    Smoking status: Never Smoker    Smokeless tobacco: Never Used       Chief Complaint:   Chief Complaint   Patient presents with    Pain  "    R hip pain, no inj or sx, pt states pain started around after left hip sx, pt states will wake her up at night, stabbing sharp pain, pt feels like hip comes out of place, pt would like to discuss possibility of sx        History of present illness: Candace Macias is a 71 y.o. female, presents to clinic today in regards to right hip pain. This pain has been ongoing for a while. Unable to abduct the right leg without a feeling of "popping out". This affects her at night and getting and out of a car. Does have an antalgic gait due to her hip pain. She has tried OTC anti-inflammatories with minimal relief.       Review of Systems:    Denies fevers, chills, chest pain, shortness of breath. Comprehensive review of systems performed and otherwise negative except as noted in HPI     Physical Examination:    General: awake and alert, no acute distress, healthy appearing  Head and Neck: Head atraumatic/normocephalic. Moist MM  CV: brisk cap refill  Lungs: non-labored breathing, w/o cough or SOB  Skin: no rashes present, warm to touch  Neuro: sensation grossly intact distally       Vital Signs:    Vitals:    06/30/22 1554   BP: 113/72   Pulse: 86       Body mass index is 16.83 kg/m².    Focused Orthopedic Exam:    right hip without wound or skin breakdown.  minimal tenderness to palpation about the greater trochanter and gluteus  Flexion to 100. Internal Rotation to 20. External Rotation to 20. Anterior groin pain with range of motion  + Stinchfield  + antalgic gait      X-rays: 3 views of the right hip. Arthritic changes noted centrally with narrowing of the joint space.      Assessment::Primary OA R hip    Plan:  At this point the patient is tried and failed all conservative management with regards to their right hip. They have tried and failed nonoperative management including: Anti-inflammatories, activity modification, and assistive devices. All of these have failed to completely remove their pain. " They have pain putting on shoes and socks, getting in and out of a car, as well as walking on level ground. Their hip pain is affecting activities of daily living They feel that they've reached a point of disability with regards to their hip. X-rays reveal advanced, end-stage degenerative osteoarthritis as noted by subchondral sclerosis, joint space narrowing, and periarticular osteophytes. The patient would like to proceed with surgical intervention and would be a good candidate for total hip replacement.    Total hip arthroplasty procedure, alternatives, risks, and benefits were discussed in detail. The risks including but not limited to: infection, need for revision surgery, pain, swelling, loosening, injury to surrounding neurovascular structures, stiffness, incomplete resolution of pain, limb length discrepancy, DVT, PE, and death were discussed in detail. Despite these risks, the patient would like to proceed with surgical intervention. All questions were answered, no guarantees made. Will plan for right SUNIL in the beginning of August.    The above findings, diagnostics, and treatment plan were discussed with Dr. Osmany Kwon who is in agreement with the plan of care.    This note was created using [employer] voice recognition software that occasionally misinterpreted phrases or words.    Consult note is delivered via Epic messaging service.

## 2022-07-05 DIAGNOSIS — M16.11 PRIMARY OSTEOARTHRITIS OF RIGHT HIP: Primary | ICD-10-CM

## 2022-07-20 DIAGNOSIS — M85.89 OSTEOPENIA OF MULTIPLE SITES: ICD-10-CM

## 2022-07-26 ENCOUNTER — HOSPITAL ENCOUNTER (OUTPATIENT)
Dept: RADIOLOGY | Facility: HOSPITAL | Age: 72
Discharge: HOME | End: 2022-07-26
Attending: NURSE PRACTITIONER
Payer: MEDICARE

## 2022-07-26 ENCOUNTER — HOSPITAL ENCOUNTER (OUTPATIENT)
Dept: RADIOLOGY | Facility: CLINIC | Age: 72
Discharge: HOME | End: 2022-07-26
Attending: ORTHOPAEDIC SURGERY
Payer: MEDICARE

## 2022-07-26 ENCOUNTER — HOSPITAL ENCOUNTER (OUTPATIENT)
Dept: RADIOLOGY | Facility: HOSPITAL | Age: 72
Discharge: HOME | End: 2022-07-26
Attending: ORTHOPAEDIC SURGERY
Payer: MEDICARE

## 2022-07-26 ENCOUNTER — OFFICE VISIT (OUTPATIENT)
Dept: ORTHOPEDICS | Facility: CLINIC | Age: 72
End: 2022-07-26
Payer: MEDICARE

## 2022-07-26 VITALS — BODY MASS INDEX: 16.88 KG/M2 | WEIGHT: 114 LBS | HEIGHT: 69 IN

## 2022-07-26 DIAGNOSIS — M16.11 PRIMARY OSTEOARTHRITIS OF RIGHT HIP: ICD-10-CM

## 2022-07-26 DIAGNOSIS — Z01.818 PREOPERATIVE TESTING: ICD-10-CM

## 2022-07-26 DIAGNOSIS — M16.11 PRIMARY OSTEOARTHRITIS OF RIGHT HIP: Primary | ICD-10-CM

## 2022-07-26 LAB
APPEARANCE UR: CLEAR
BACTERIA #/AREA URNS AUTO: ABNORMAL /HPF
BILIRUB UR QL STRIP.AUTO: NEGATIVE MG/DL
COLOR UR AUTO: YELLOW
GLUCOSE UR QL STRIP.AUTO: NEGATIVE MG/DL
KETONES UR QL STRIP.AUTO: NEGATIVE MG/DL
LEUKOCYTE ESTERASE UR QL STRIP.AUTO: NEGATIVE UNIT/L
MRSA PCR SCRN (OHS): NOT DETECTED
MUCOUS THREADS URNS QL MICRO: ABNORMAL /LPF
NITRITE UR QL STRIP.AUTO: NEGATIVE
PH UR STRIP.AUTO: 5.5 [PH]
PROT UR QL STRIP.AUTO: NEGATIVE MG/DL
RBC #/AREA URNS AUTO: ABNORMAL /HPF
RBC UR QL AUTO: ABNORMAL UNIT/L
SP GR UR STRIP.AUTO: >=1.03
SQUAMOUS #/AREA URNS AUTO: ABNORMAL /HPF
UROBILINOGEN UR STRIP-ACNC: 0.2 MG/DL
WBC #/AREA URNS AUTO: ABNORMAL /HPF

## 2022-07-26 PROCEDURE — 72100 X-RAY EXAM L-S SPINE 2/3 VWS: CPT | Mod: ,,, | Performed by: ORTHOPAEDIC SURGERY

## 2022-07-26 PROCEDURE — 72100 XR LUMBAR SPINE AP AND LATERAL: ICD-10-PCS | Mod: ,,, | Performed by: ORTHOPAEDIC SURGERY

## 2022-07-26 PROCEDURE — 73700 CT LOWER EXTREMITY W/O DYE: CPT | Mod: TC,RT

## 2022-07-26 PROCEDURE — 99213 PR OFFICE/OUTPT VISIT, EST, LEVL III, 20-29 MIN: ICD-10-PCS | Mod: ,,, | Performed by: ORTHOPAEDIC SURGERY

## 2022-07-26 PROCEDURE — 99213 OFFICE O/P EST LOW 20 MIN: CPT | Mod: ,,, | Performed by: ORTHOPAEDIC SURGERY

## 2022-07-26 PROCEDURE — 71046 X-RAY EXAM CHEST 2 VIEWS: CPT | Mod: TC

## 2022-07-26 RX ORDER — IBUPROFEN 100 MG/5ML
1000 SUSPENSION, ORAL (FINAL DOSE FORM) ORAL DAILY
COMMUNITY

## 2022-07-26 RX ORDER — ACETAMINOPHEN 500 MG
1000 TABLET ORAL
Status: CANCELLED | OUTPATIENT
Start: 2022-07-26 | End: 2022-07-26

## 2022-07-26 RX ORDER — SODIUM CHLORIDE 9 MG/ML
INJECTION, SOLUTION INTRAVENOUS CONTINUOUS
Status: CANCELLED | OUTPATIENT
Start: 2022-07-26

## 2022-07-26 RX ORDER — TRANEXAMIC ACID 650 MG/1
1950 TABLET ORAL
Status: CANCELLED | OUTPATIENT
Start: 2022-07-26 | End: 2022-07-26

## 2022-07-26 RX ORDER — GABAPENTIN 100 MG/1
600 CAPSULE ORAL
Status: CANCELLED | OUTPATIENT
Start: 2022-07-26

## 2022-07-26 RX ORDER — SCOLOPAMINE TRANSDERMAL SYSTEM 1 MG/1
1 PATCH, EXTENDED RELEASE TRANSDERMAL
Status: CANCELLED | OUTPATIENT
Start: 2022-07-26

## 2022-07-26 RX ORDER — KETOROLAC TROMETHAMINE 30 MG/ML
15 INJECTION, SOLUTION INTRAMUSCULAR; INTRAVENOUS
Status: CANCELLED | OUTPATIENT
Start: 2022-07-26 | End: 2022-07-26

## 2022-07-26 RX ORDER — CHOLECALCIFEROL (VITAMIN D3) 25 MCG
1000 TABLET ORAL DAILY
COMMUNITY

## 2022-07-26 NOTE — PROGRESS NOTES
Past Medical History:   Diagnosis Date    A-fib     Asthma     Breast cancer     Fibromyalgia     Osteoarthritis        Past Surgical History:   Procedure Laterality Date    APPENDECTOMY      MASTECTOMY Bilateral     TOTAL HIP ARTHROPLASTY Left     PATRICK GIRON       Current Outpatient Medications   Medication Sig    amitriptyline (ELAVIL) 50 MG tablet Take 50 mg by mouth nightly.    aspirin (ECOTRIN) 325 MG EC tablet Take 325 mg by mouth.    azithromycin (Z-VANESSA) 250 MG tablet TAKE 2 TABLETS BY MOUTH TODAY, THEN TAKE 1 TABLET DAILY FOR 4 DAYS    bisacodyL (DULCOLAX) 5 mg EC tablet Take 10 mg by mouth daily as needed.    letrozole (FEMARA) 2.5 mg Tab Take 2.5 mg by mouth.    methylPREDNISolone (MEDROL DOSEPACK) 4 mg tablet TAKE 6 TABLETS ON DAY 1 AS DIRECTED ON PACKAGE AND DECREASE BY 1 TAB EACH DAY FOR A TOTAL OF 6 DAYS    metoprolol succinate (TOPROL-XL) 25 MG 24 hr tablet Take 25 mg by mouth once daily.    pregabalin (LYRICA) 50 MG capsule Take 1 capsule by mouth every evening.    rOPINIRole (REQUIP) 1 MG tablet Take 2 mg by mouth nightly.    tiZANidine (ZANAFLEX) 4 MG tablet Take 4 mg by mouth daily as needed.    traMADoL (ULTRAM) 50 mg tablet Take 50 mg by mouth 4 (four) times daily as needed.    triamterene-hydrochlorothiazide 37.5-25 mg (DYAZIDE) 37.5-25 mg per capsule Take 1 capsule by mouth once daily.    vitamin E 400 UNIT capsule Take 400 Units by mouth.    XARELTO 20 mg Tab Take 20 mg by mouth once daily.     No current facility-administered medications for this visit.       Review of patient's allergies indicates:   Allergen Reactions    Loratadine-pseudoephedrine Rash and Shortness Of Breath     Other reaction(s): hives, pruritis       History reviewed. No pertinent family history.    Social History     Socioeconomic History    Marital status:    Tobacco Use    Smoking status: Never Smoker    Smokeless tobacco: Never Used       Chief Complaint:   Chief Complaint   Patient  presents with    Pre-op Exam     Pre-op for right SUNIL on 8/3/22,        History of present illness: Candace Macias is a 71 y.o. female, presents to clinic today in regards to right hip pain.  This is found on quite some time now.  This is chronic in nature.  This is worsened over time.  Pain is located in the anterior groin.  The pain is worse with getting up from a sitting position, putting on socks and shoes, squatting.  Patient has tried conservative measures such as lifestyle modifications and over-the-counter anti-inflammatories with very little relief.  This pain is affecting her daily living activities.  She does feel as though she has responded disability and I to proceed with a right total hip arthroplasty.      Review of Systems:    Denies fevers, chills, chest pain, shortness of breath. Comprehensive review of systems performed and otherwise negative except as noted in HPI     Physical Examination:    General: awake and alert, no acute distress, healthy appearing  Head and Neck: Head atraumatic/normocephalic. Moist MM  CV: brisk cap refill  Lungs: non-labored breathing, w/o cough or SOB  Skin: no rashes present, warm to touch  Neuro: sensation grossly intact distally       Vital Signs:  There were no vitals filed for this visit.    Body mass index is 16.83 kg/m².    Focused Orthopedic Exam:     right hip without wound or skin breakdown.  minimal tenderness to palpation about the greater trochanter and gluteus  Flexion to 100. Internal Rotation to 20. External Rotation to 20. Anterior groin pain with range of motion  + Stinchfield  + antalgic gait       X-rays: lateral sitting and standing x-rays obtained for pre operative evaluation.     Assessment::Primary OA R hip     Plan:  At this point the patient is tried and failed all conservative management with regards to their right hip. They have tried and failed nonoperative management including: Anti-inflammatories, activity modification,  and assistive devices. All of these have failed to completely remove their pain. They have pain putting on shoes and socks, getting in and out of a car, as well as walking on level ground. Their hip pain is affecting activities of daily living They feel that they've reached a point of disability with regards to their hip. X-rays reveal advanced, end-stage degenerative osteoarthritis as noted by subchondral sclerosis, joint space narrowing, and periarticular osteophytes. The patient would like to proceed with surgical intervention and would be a good candidate for total hip replacement.    Total hip arthroplasty procedure, alternatives, risks, and benefits were discussed in detail. The risks including but not limited to: infection, need for revision surgery, pain, swelling, loosening, injury to surrounding neurovascular structures, stiffness, incomplete resolution of pain, limb length discrepancy, DVT, PE, and death were discussed in detail. Despite these risks, the patient would like to proceed with surgical intervention. All questions were answered, no guarantees made. Will plan for right SUNIL on 08/03/2022.    This note was created using [employer] voice recognition software that occasionally misinterpreted phrases or words.    Consult note is delivered via Epic messaging service.

## 2022-07-26 NOTE — H&P (VIEW-ONLY)
Past Medical History:   Diagnosis Date    A-fib     Asthma     Breast cancer     Fibromyalgia     Osteoarthritis        Past Surgical History:   Procedure Laterality Date    APPENDECTOMY      MASTECTOMY Bilateral     TOTAL HIP ARTHROPLASTY Left     PATRICK GIRON       Current Outpatient Medications   Medication Sig    amitriptyline (ELAVIL) 50 MG tablet Take 50 mg by mouth nightly.    aspirin (ECOTRIN) 325 MG EC tablet Take 325 mg by mouth.    azithromycin (Z-VANESSA) 250 MG tablet TAKE 2 TABLETS BY MOUTH TODAY, THEN TAKE 1 TABLET DAILY FOR 4 DAYS    bisacodyL (DULCOLAX) 5 mg EC tablet Take 10 mg by mouth daily as needed.    letrozole (FEMARA) 2.5 mg Tab Take 2.5 mg by mouth.    methylPREDNISolone (MEDROL DOSEPACK) 4 mg tablet TAKE 6 TABLETS ON DAY 1 AS DIRECTED ON PACKAGE AND DECREASE BY 1 TAB EACH DAY FOR A TOTAL OF 6 DAYS    metoprolol succinate (TOPROL-XL) 25 MG 24 hr tablet Take 25 mg by mouth once daily.    pregabalin (LYRICA) 50 MG capsule Take 1 capsule by mouth every evening.    rOPINIRole (REQUIP) 1 MG tablet Take 2 mg by mouth nightly.    tiZANidine (ZANAFLEX) 4 MG tablet Take 4 mg by mouth daily as needed.    traMADoL (ULTRAM) 50 mg tablet Take 50 mg by mouth 4 (four) times daily as needed.    triamterene-hydrochlorothiazide 37.5-25 mg (DYAZIDE) 37.5-25 mg per capsule Take 1 capsule by mouth once daily.    vitamin E 400 UNIT capsule Take 400 Units by mouth.    XARELTO 20 mg Tab Take 20 mg by mouth once daily.     No current facility-administered medications for this visit.       Review of patient's allergies indicates:   Allergen Reactions    Loratadine-pseudoephedrine Rash and Shortness Of Breath     Other reaction(s): hives, pruritis       History reviewed. No pertinent family history.    Social History     Socioeconomic History    Marital status:    Tobacco Use    Smoking status: Never Smoker    Smokeless tobacco: Never Used       Chief Complaint:   Chief Complaint   Patient  Previous INR: 2.50     Previous dose:  6mg (5mg +1mg tablets) on mon and Friday and 5mg all others               Current INR: 2.80     Recommendation: continue 6mg (5mg +1mg tablets) on mon and Friday and 5mg all others    Next INR: 1 week     Lobo Mills MD  11/11/2021  12:28 PM presents with    Pre-op Exam     Pre-op for right SUNIL on 8/3/22,        History of present illness: Candace Macias is a 71 y.o. female, presents to clinic today in regards to right hip pain.  This is found on quite some time now.  This is chronic in nature.  This is worsened over time.  Pain is located in the anterior groin.  The pain is worse with getting up from a sitting position, putting on socks and shoes, squatting.  Patient has tried conservative measures such as lifestyle modifications and over-the-counter anti-inflammatories with very little relief.  This pain is affecting her daily living activities.  She does feel as though she has responded disability and I to proceed with a right total hip arthroplasty.      Review of Systems:    Denies fevers, chills, chest pain, shortness of breath. Comprehensive review of systems performed and otherwise negative except as noted in HPI     Physical Examination:    General: awake and alert, no acute distress, healthy appearing  Head and Neck: Head atraumatic/normocephalic. Moist MM  CV: brisk cap refill  Lungs: non-labored breathing, w/o cough or SOB  Skin: no rashes present, warm to touch  Neuro: sensation grossly intact distally       Vital Signs:  There were no vitals filed for this visit.    Body mass index is 16.83 kg/m².    Focused Orthopedic Exam:     right hip without wound or skin breakdown.  minimal tenderness to palpation about the greater trochanter and gluteus  Flexion to 100. Internal Rotation to 20. External Rotation to 20. Anterior groin pain with range of motion  + Stinchfield  + antalgic gait       X-rays: lateral sitting and standing x-rays obtained for pre operative evaluation.     Assessment::Primary OA R hip     Plan:  At this point the patient is tried and failed all conservative management with regards to their right hip. They have tried and failed nonoperative management including: Anti-inflammatories, activity modification,  and assistive devices. All of these have failed to completely remove their pain. They have pain putting on shoes and socks, getting in and out of a car, as well as walking on level ground. Their hip pain is affecting activities of daily living They feel that they've reached a point of disability with regards to their hip. X-rays reveal advanced, end-stage degenerative osteoarthritis as noted by subchondral sclerosis, joint space narrowing, and periarticular osteophytes. The patient would like to proceed with surgical intervention and would be a good candidate for total hip replacement.    Total hip arthroplasty procedure, alternatives, risks, and benefits were discussed in detail. The risks including but not limited to: infection, need for revision surgery, pain, swelling, loosening, injury to surrounding neurovascular structures, stiffness, incomplete resolution of pain, limb length discrepancy, DVT, PE, and death were discussed in detail. Despite these risks, the patient would like to proceed with surgical intervention. All questions were answered, no guarantees made. Will plan for right SUNIL on 08/03/2022.    This note was created using [employer] voice recognition software that occasionally misinterpreted phrases or words.    Consult note is delivered via Epic messaging service.

## 2022-08-02 ENCOUNTER — ANESTHESIA EVENT (OUTPATIENT)
Dept: SURGERY | Facility: HOSPITAL | Age: 72
DRG: 470 | End: 2022-08-02
Payer: MEDICARE

## 2022-08-03 ENCOUNTER — HOSPITAL ENCOUNTER (INPATIENT)
Facility: HOSPITAL | Age: 72
LOS: 2 days | Discharge: HOME HEALTH | DRG: 470 | End: 2022-08-05
Attending: ORTHOPAEDIC SURGERY | Admitting: ORTHOPAEDIC SURGERY
Payer: MEDICARE

## 2022-08-03 ENCOUNTER — ANESTHESIA (OUTPATIENT)
Dept: SURGERY | Facility: HOSPITAL | Age: 72
DRG: 470 | End: 2022-08-03
Payer: MEDICARE

## 2022-08-03 DIAGNOSIS — M16.9 OSTEOARTHRITIS OF HIP: ICD-10-CM

## 2022-08-03 DIAGNOSIS — Z01.818 PREOPERATIVE TESTING: ICD-10-CM

## 2022-08-03 DIAGNOSIS — M16.11 PRIMARY OSTEOARTHRITIS OF RIGHT HIP: ICD-10-CM

## 2022-08-03 LAB
GLUCOSE SERPL-MCNC: 98 MG/DL (ref 70–110)
HCT VFR BLD AUTO: 37.7 % (ref 37–47)
HGB BLD-MCNC: 12.5 GM/DL (ref 12–16)

## 2022-08-03 PROCEDURE — C1776 JOINT DEVICE (IMPLANTABLE): HCPCS | Performed by: ORTHOPAEDIC SURGERY

## 2022-08-03 PROCEDURE — 63600175 PHARM REV CODE 636 W HCPCS: Performed by: NURSE ANESTHETIST, CERTIFIED REGISTERED

## 2022-08-03 PROCEDURE — 30000890 HC MISC. SEND OUT TEST: Performed by: ORTHOPAEDIC SURGERY

## 2022-08-03 PROCEDURE — 97162 PT EVAL MOD COMPLEX 30 MIN: CPT

## 2022-08-03 PROCEDURE — 85014 HEMATOCRIT: CPT | Performed by: ORTHOPAEDIC SURGERY

## 2022-08-03 PROCEDURE — 27130 PR TOTAL HIP ARTHROPLASTY: ICD-10-PCS | Mod: AS,RT,, | Performed by: NURSE PRACTITIONER

## 2022-08-03 PROCEDURE — 25000003 PHARM REV CODE 250: Performed by: NURSE ANESTHETIST, CERTIFIED REGISTERED

## 2022-08-03 PROCEDURE — C1713 ANCHOR/SCREW BN/BN,TIS/BN: HCPCS | Performed by: ORTHOPAEDIC SURGERY

## 2022-08-03 PROCEDURE — 94799 UNLISTED PULMONARY SVC/PX: CPT

## 2022-08-03 PROCEDURE — 20985 CPTR-ASST DIR MS PX: CPT | Mod: ,,, | Performed by: ORTHOPAEDIC SURGERY

## 2022-08-03 PROCEDURE — 71000033 HC RECOVERY, INTIAL HOUR: Performed by: ORTHOPAEDIC SURGERY

## 2022-08-03 PROCEDURE — 36000712 HC OR TIME LEV V 1ST 15 MIN: Performed by: ORTHOPAEDIC SURGERY

## 2022-08-03 PROCEDURE — 63600175 PHARM REV CODE 636 W HCPCS: Performed by: NURSE PRACTITIONER

## 2022-08-03 PROCEDURE — 82962 GLUCOSE BLOOD TEST: CPT | Performed by: ORTHOPAEDIC SURGERY

## 2022-08-03 PROCEDURE — 88305 TISSUE EXAM BY PATHOLOGIST: CPT | Performed by: ORTHOPAEDIC SURGERY

## 2022-08-03 PROCEDURE — 36000713 HC OR TIME LEV V EA ADD 15 MIN: Performed by: ORTHOPAEDIC SURGERY

## 2022-08-03 PROCEDURE — 37000008 HC ANESTHESIA 1ST 15 MINUTES: Performed by: ORTHOPAEDIC SURGERY

## 2022-08-03 PROCEDURE — 20985 PR CPTR-ASST SURGICAL NAVIGATION IMAGE-LESS: ICD-10-PCS | Mod: ,,, | Performed by: ORTHOPAEDIC SURGERY

## 2022-08-03 PROCEDURE — 36415 COLL VENOUS BLD VENIPUNCTURE: CPT | Performed by: ORTHOPAEDIC SURGERY

## 2022-08-03 PROCEDURE — 63600175 PHARM REV CODE 636 W HCPCS: Performed by: ORTHOPAEDIC SURGERY

## 2022-08-03 PROCEDURE — 11000001 HC ACUTE MED/SURG PRIVATE ROOM

## 2022-08-03 PROCEDURE — 30000890 SPECIMEN TO PATHOLOGY: Performed by: ORTHOPAEDIC SURGERY

## 2022-08-03 PROCEDURE — 27201423 OPTIME MED/SURG SUP & DEVICES STERILE SUPPLY: Performed by: ORTHOPAEDIC SURGERY

## 2022-08-03 PROCEDURE — A4216 STERILE WATER/SALINE, 10 ML: HCPCS | Performed by: ORTHOPAEDIC SURGERY

## 2022-08-03 PROCEDURE — 25000003 PHARM REV CODE 250: Performed by: NURSE PRACTITIONER

## 2022-08-03 PROCEDURE — 37000009 HC ANESTHESIA EA ADD 15 MINS: Performed by: ORTHOPAEDIC SURGERY

## 2022-08-03 PROCEDURE — 27130 TOTAL HIP ARTHROPLASTY: CPT | Mod: RT,,, | Performed by: ORTHOPAEDIC SURGERY

## 2022-08-03 PROCEDURE — 27130 TOTAL HIP ARTHROPLASTY: CPT | Mod: AS,RT,, | Performed by: NURSE PRACTITIONER

## 2022-08-03 PROCEDURE — 94761 N-INVAS EAR/PLS OXIMETRY MLT: CPT

## 2022-08-03 PROCEDURE — 71000039 HC RECOVERY, EACH ADD'L HOUR: Performed by: ORTHOPAEDIC SURGERY

## 2022-08-03 PROCEDURE — 25000003 PHARM REV CODE 250: Performed by: ORTHOPAEDIC SURGERY

## 2022-08-03 PROCEDURE — 27130 PR TOTAL HIP ARTHROPLASTY: ICD-10-PCS | Mod: RT,,, | Performed by: ORTHOPAEDIC SURGERY

## 2022-08-03 PROCEDURE — 88311 DECALCIFY TISSUE: CPT | Performed by: ORTHOPAEDIC SURGERY

## 2022-08-03 DEVICE — TRIDENT II TRITANIUM CLUSTER 48D
Type: IMPLANTABLE DEVICE | Site: HIP | Status: FUNCTIONAL
Brand: TRIDENT II

## 2022-08-03 DEVICE — CERAMIC V40 FEMORAL HEAD
Type: IMPLANTABLE DEVICE | Site: HIP | Status: FUNCTIONAL
Brand: BIOLOX

## 2022-08-03 DEVICE — 127 DEGREE NECK ANGLE HIP STEM
Type: IMPLANTABLE DEVICE | Site: HIP | Status: FUNCTIONAL
Brand: ACCOLADE

## 2022-08-03 DEVICE — TRIDENT X3 0 DEGREE POLYETHYLENE INSERT
Type: IMPLANTABLE DEVICE | Site: HIP | Status: FUNCTIONAL
Brand: TRIDENT X3 INSERT

## 2022-08-03 RX ORDER — MORPHINE SULFATE 4 MG/ML
4 INJECTION, SOLUTION INTRAMUSCULAR; INTRAVENOUS
Status: ACTIVE | OUTPATIENT
Start: 2022-08-03 | End: 2022-08-04

## 2022-08-03 RX ORDER — KETOROLAC TROMETHAMINE 30 MG/ML
15 INJECTION, SOLUTION INTRAMUSCULAR; INTRAVENOUS
Status: COMPLETED | OUTPATIENT
Start: 2022-08-03 | End: 2022-08-03

## 2022-08-03 RX ORDER — DEXAMETHASONE SODIUM PHOSPHATE 4 MG/ML
INJECTION, SOLUTION INTRA-ARTICULAR; INTRALESIONAL; INTRAMUSCULAR; INTRAVENOUS; SOFT TISSUE
Status: DISCONTINUED | OUTPATIENT
Start: 2022-08-03 | End: 2022-08-03

## 2022-08-03 RX ORDER — FENTANYL CITRATE 50 UG/ML
INJECTION, SOLUTION INTRAMUSCULAR; INTRAVENOUS
Status: DISCONTINUED | OUTPATIENT
Start: 2022-08-03 | End: 2022-08-03

## 2022-08-03 RX ORDER — DOCUSATE SODIUM 100 MG/1
200 CAPSULE, LIQUID FILLED ORAL DAILY
Status: DISCONTINUED | OUTPATIENT
Start: 2022-08-04 | End: 2022-08-05 | Stop reason: HOSPADM

## 2022-08-03 RX ORDER — TRAMADOL HYDROCHLORIDE 50 MG/1
50 TABLET ORAL EVERY 4 HOURS PRN
Status: DISCONTINUED | OUTPATIENT
Start: 2022-08-03 | End: 2022-08-05 | Stop reason: HOSPADM

## 2022-08-03 RX ORDER — CEFAZOLIN SODIUM 2 G/50ML
2 SOLUTION INTRAVENOUS
Status: DISCONTINUED | OUTPATIENT
Start: 2022-08-03 | End: 2022-08-05 | Stop reason: HOSPADM

## 2022-08-03 RX ORDER — CALCIUM CARBONATE 200(500)MG
500 TABLET,CHEWABLE ORAL 3 TIMES DAILY PRN
Status: DISCONTINUED | OUTPATIENT
Start: 2022-08-03 | End: 2022-08-05 | Stop reason: HOSPADM

## 2022-08-03 RX ORDER — METOPROLOL SUCCINATE 25 MG/1
25 TABLET, EXTENDED RELEASE ORAL DAILY
Status: DISCONTINUED | OUTPATIENT
Start: 2022-08-03 | End: 2022-08-03

## 2022-08-03 RX ORDER — MIDAZOLAM HYDROCHLORIDE 1 MG/ML
2 INJECTION INTRAMUSCULAR; INTRAVENOUS ONCE AS NEEDED
Status: CANCELLED | OUTPATIENT
Start: 2022-08-03 | End: 2033-12-29

## 2022-08-03 RX ORDER — LETROZOLE 2.5 MG/1
2.5 TABLET, FILM COATED ORAL NIGHTLY
Status: DISCONTINUED | OUTPATIENT
Start: 2022-08-03 | End: 2022-08-05 | Stop reason: HOSPADM

## 2022-08-03 RX ORDER — SCOLOPAMINE TRANSDERMAL SYSTEM 1 MG/1
1 PATCH, EXTENDED RELEASE TRANSDERMAL
Status: DISCONTINUED | OUTPATIENT
Start: 2022-08-03 | End: 2022-08-03

## 2022-08-03 RX ORDER — TALC
6 POWDER (GRAM) TOPICAL NIGHTLY PRN
Status: DISCONTINUED | OUTPATIENT
Start: 2022-08-03 | End: 2022-08-05 | Stop reason: HOSPADM

## 2022-08-03 RX ORDER — KETOROLAC TROMETHAMINE 30 MG/ML
INJECTION, SOLUTION INTRAMUSCULAR; INTRAVENOUS
Status: DISCONTINUED | OUTPATIENT
Start: 2022-08-03 | End: 2022-08-03 | Stop reason: HOSPADM

## 2022-08-03 RX ORDER — EPINEPHRINE 1 MG/ML
INJECTION, SOLUTION INTRACARDIAC; INTRAMUSCULAR; INTRAVENOUS; SUBCUTANEOUS
Status: DISCONTINUED | OUTPATIENT
Start: 2022-08-03 | End: 2022-08-03 | Stop reason: HOSPADM

## 2022-08-03 RX ORDER — LETROZOLE 2.5 MG/1
2.5 TABLET, FILM COATED ORAL DAILY
Status: DISCONTINUED | OUTPATIENT
Start: 2022-08-03 | End: 2022-08-03

## 2022-08-03 RX ORDER — MORPHINE SULFATE 10 MG/ML
INJECTION INTRAMUSCULAR; INTRAVENOUS; SUBCUTANEOUS
Status: DISPENSED
Start: 2022-08-03 | End: 2022-08-03

## 2022-08-03 RX ORDER — AMOXICILLIN 250 MG
2 CAPSULE ORAL 2 TIMES DAILY
Status: DISCONTINUED | OUTPATIENT
Start: 2022-08-03 | End: 2022-08-05 | Stop reason: HOSPADM

## 2022-08-03 RX ORDER — FAMOTIDINE 20 MG/1
20 TABLET, FILM COATED ORAL 2 TIMES DAILY
Status: DISCONTINUED | OUTPATIENT
Start: 2022-08-03 | End: 2022-08-05 | Stop reason: HOSPADM

## 2022-08-03 RX ORDER — ROPINIROLE 1 MG/1
2 TABLET, FILM COATED ORAL NIGHTLY
Status: DISCONTINUED | OUTPATIENT
Start: 2022-08-03 | End: 2022-08-05 | Stop reason: HOSPADM

## 2022-08-03 RX ORDER — ACETAMINOPHEN 10 MG/ML
1000 INJECTION, SOLUTION INTRAVENOUS ONCE
Status: DISCONTINUED | OUTPATIENT
Start: 2022-08-03 | End: 2022-08-03

## 2022-08-03 RX ORDER — MIDAZOLAM HYDROCHLORIDE 1 MG/ML
INJECTION INTRAMUSCULAR; INTRAVENOUS
Status: DISCONTINUED | OUTPATIENT
Start: 2022-08-03 | End: 2022-08-03

## 2022-08-03 RX ORDER — ACETAMINOPHEN 500 MG
500 TABLET ORAL EVERY 4 HOURS
Status: DISCONTINUED | OUTPATIENT
Start: 2022-08-03 | End: 2022-08-05 | Stop reason: HOSPADM

## 2022-08-03 RX ORDER — ROPIVACAINE HYDROCHLORIDE 5 MG/ML
INJECTION, SOLUTION EPIDURAL; INFILTRATION; PERINEURAL
Status: DISCONTINUED | OUTPATIENT
Start: 2022-08-03 | End: 2022-08-03 | Stop reason: HOSPADM

## 2022-08-03 RX ORDER — KETOROLAC TROMETHAMINE 30 MG/ML
15 INJECTION, SOLUTION INTRAMUSCULAR; INTRAVENOUS EVERY 6 HOURS
Status: COMPLETED | OUTPATIENT
Start: 2022-08-03 | End: 2022-08-04

## 2022-08-03 RX ORDER — POLYETHYLENE GLYCOL 3350 17 G/17G
17 POWDER, FOR SOLUTION ORAL NIGHTLY
Status: DISCONTINUED | OUTPATIENT
Start: 2022-08-03 | End: 2022-08-05 | Stop reason: HOSPADM

## 2022-08-03 RX ORDER — SODIUM CHLORIDE 9 MG/ML
INJECTION, SOLUTION INTRAVENOUS CONTINUOUS
Status: DISCONTINUED | OUTPATIENT
Start: 2022-08-03 | End: 2022-08-05 | Stop reason: HOSPADM

## 2022-08-03 RX ORDER — MORPHINE SULFATE 10 MG/ML
INJECTION INTRAMUSCULAR; INTRAVENOUS; SUBCUTANEOUS
Status: DISCONTINUED | OUTPATIENT
Start: 2022-08-03 | End: 2022-08-03 | Stop reason: HOSPADM

## 2022-08-03 RX ORDER — KETOROLAC TROMETHAMINE 30 MG/ML
INJECTION, SOLUTION INTRAMUSCULAR; INTRAVENOUS
Status: DISPENSED
Start: 2022-08-03 | End: 2022-08-03

## 2022-08-03 RX ORDER — SODIUM CHLORIDE 9 MG/ML
INJECTION, SOLUTION INTRAVENOUS CONTINUOUS
Status: DISCONTINUED | OUTPATIENT
Start: 2022-08-03 | End: 2022-08-03

## 2022-08-03 RX ORDER — GABAPENTIN 300 MG/1
600 CAPSULE ORAL
Status: COMPLETED | OUTPATIENT
Start: 2022-08-03 | End: 2022-08-03

## 2022-08-03 RX ORDER — PHENYLEPHRINE HYDROCHLORIDE 10 MG/ML
INJECTION INTRAVENOUS
Status: DISCONTINUED | OUTPATIENT
Start: 2022-08-03 | End: 2022-08-03

## 2022-08-03 RX ORDER — ROPIVACAINE HYDROCHLORIDE 5 MG/ML
INJECTION, SOLUTION EPIDURAL; INFILTRATION; PERINEURAL
Status: DISPENSED
Start: 2022-08-03 | End: 2022-08-03

## 2022-08-03 RX ORDER — MIDAZOLAM HYDROCHLORIDE 1 MG/ML
INJECTION INTRAMUSCULAR; INTRAVENOUS
Status: COMPLETED
Start: 2022-08-03 | End: 2022-08-03

## 2022-08-03 RX ORDER — CEFAZOLIN SODIUM 2 G/50ML
2 SOLUTION INTRAVENOUS
Status: COMPLETED | OUTPATIENT
Start: 2022-08-03 | End: 2022-08-04

## 2022-08-03 RX ORDER — PROPOFOL 10 MG/ML
VIAL (ML) INTRAVENOUS
Status: DISCONTINUED | OUTPATIENT
Start: 2022-08-03 | End: 2022-08-03

## 2022-08-03 RX ORDER — METOPROLOL SUCCINATE 25 MG/1
25 TABLET, EXTENDED RELEASE ORAL NIGHTLY
Status: DISCONTINUED | OUTPATIENT
Start: 2022-08-04 | End: 2022-08-03

## 2022-08-03 RX ORDER — ONDANSETRON 2 MG/ML
4 INJECTION INTRAMUSCULAR; INTRAVENOUS EVERY 6 HOURS PRN
Status: DISCONTINUED | OUTPATIENT
Start: 2022-08-03 | End: 2022-08-05 | Stop reason: HOSPADM

## 2022-08-03 RX ORDER — LACTULOSE 10 G/15ML
20 SOLUTION ORAL EVERY 6 HOURS PRN
Status: DISCONTINUED | OUTPATIENT
Start: 2022-08-03 | End: 2022-08-05 | Stop reason: HOSPADM

## 2022-08-03 RX ORDER — SODIUM CHLORIDE, SODIUM GLUCONATE, SODIUM ACETATE, POTASSIUM CHLORIDE AND MAGNESIUM CHLORIDE 30; 37; 368; 526; 502 MG/100ML; MG/100ML; MG/100ML; MG/100ML; MG/100ML
1000 INJECTION, SOLUTION INTRAVENOUS CONTINUOUS
Status: CANCELLED | OUTPATIENT
Start: 2022-08-03 | End: 2022-09-02

## 2022-08-03 RX ORDER — ONDANSETRON 2 MG/ML
4 INJECTION INTRAMUSCULAR; INTRAVENOUS DAILY PRN
Status: DISCONTINUED | OUTPATIENT
Start: 2022-08-03 | End: 2022-08-03

## 2022-08-03 RX ORDER — LIDOCAINE HYDROCHLORIDE 10 MG/ML
1 INJECTION, SOLUTION EPIDURAL; INFILTRATION; INTRACAUDAL; PERINEURAL ONCE
Status: CANCELLED | OUTPATIENT
Start: 2022-08-03 | End: 2022-08-03

## 2022-08-03 RX ORDER — SODIUM CHLORIDE 9 MG/ML
INJECTION, SOLUTION INTRAMUSCULAR; INTRAVENOUS; SUBCUTANEOUS
Status: DISCONTINUED | OUTPATIENT
Start: 2022-08-03 | End: 2022-08-03 | Stop reason: HOSPADM

## 2022-08-03 RX ORDER — TRANEXAMIC ACID 650 MG/1
1950 TABLET ORAL
Status: COMPLETED | OUTPATIENT
Start: 2022-08-03 | End: 2022-08-03

## 2022-08-03 RX ORDER — BISACODYL 10 MG
10 SUPPOSITORY, RECTAL RECTAL DAILY
Status: DISCONTINUED | OUTPATIENT
Start: 2022-08-06 | End: 2022-08-05 | Stop reason: HOSPADM

## 2022-08-03 RX ORDER — ROCURONIUM BROMIDE 10 MG/ML
INJECTION, SOLUTION INTRAVENOUS
Status: DISCONTINUED | OUTPATIENT
Start: 2022-08-03 | End: 2022-08-03

## 2022-08-03 RX ORDER — ACETAMINOPHEN 10 MG/ML
1000 INJECTION, SOLUTION INTRAVENOUS ONCE
Status: COMPLETED | OUTPATIENT
Start: 2022-08-03 | End: 2022-08-03

## 2022-08-03 RX ORDER — LETROZOLE 2.5 MG/1
2.5 TABLET, FILM COATED ORAL NIGHTLY
Status: DISCONTINUED | OUTPATIENT
Start: 2022-08-04 | End: 2022-08-03

## 2022-08-03 RX ORDER — DIPHENHYDRAMINE HYDROCHLORIDE 50 MG/ML
25 INJECTION INTRAMUSCULAR; INTRAVENOUS EVERY 6 HOURS PRN
Status: DISCONTINUED | OUTPATIENT
Start: 2022-08-03 | End: 2022-08-03

## 2022-08-03 RX ORDER — METOPROLOL SUCCINATE 25 MG/1
25 TABLET, EXTENDED RELEASE ORAL NIGHTLY
Status: DISCONTINUED | OUTPATIENT
Start: 2022-08-03 | End: 2022-08-05 | Stop reason: HOSPADM

## 2022-08-03 RX ORDER — EPINEPHRINE 1 MG/ML
INJECTION, SOLUTION INTRACARDIAC; INTRAMUSCULAR; INTRAVENOUS; SUBCUTANEOUS
Status: DISPENSED
Start: 2022-08-03 | End: 2022-08-03

## 2022-08-03 RX ORDER — HYDROCODONE BITARTRATE AND ACETAMINOPHEN 5; 325 MG/1; MG/1
1 TABLET ORAL EVERY 4 HOURS PRN
Status: DISCONTINUED | OUTPATIENT
Start: 2022-08-03 | End: 2022-08-05 | Stop reason: HOSPADM

## 2022-08-03 RX ORDER — HYDROMORPHONE HYDROCHLORIDE 2 MG/ML
0.4 INJECTION, SOLUTION INTRAMUSCULAR; INTRAVENOUS; SUBCUTANEOUS EVERY 5 MIN PRN
Status: DISCONTINUED | OUTPATIENT
Start: 2022-08-03 | End: 2022-08-03

## 2022-08-03 RX ORDER — MAG HYDROX/ALUMINUM HYD/SIMETH 200-200-20
30 SUSPENSION, ORAL (FINAL DOSE FORM) ORAL EVERY 6 HOURS PRN
Status: DISCONTINUED | OUTPATIENT
Start: 2022-08-03 | End: 2022-08-05 | Stop reason: HOSPADM

## 2022-08-03 RX ORDER — SODIUM CHLORIDE 0.9 % (FLUSH) 0.9 %
SYRINGE (ML) INJECTION
Status: DISPENSED
Start: 2022-08-03 | End: 2022-08-03

## 2022-08-03 RX ORDER — ACETAMINOPHEN 500 MG
1000 TABLET ORAL
Status: COMPLETED | OUTPATIENT
Start: 2022-08-03 | End: 2022-08-03

## 2022-08-03 RX ORDER — GABAPENTIN 300 MG/1
300 CAPSULE ORAL NIGHTLY
Status: DISCONTINUED | OUTPATIENT
Start: 2022-08-03 | End: 2022-08-03

## 2022-08-03 RX ORDER — AMITRIPTYLINE HYDROCHLORIDE 25 MG/1
50 TABLET, FILM COATED ORAL NIGHTLY
Status: DISCONTINUED | OUTPATIENT
Start: 2022-08-03 | End: 2022-08-05 | Stop reason: HOSPADM

## 2022-08-03 RX ORDER — ONDANSETRON 2 MG/ML
INJECTION INTRAMUSCULAR; INTRAVENOUS
Status: DISCONTINUED | OUTPATIENT
Start: 2022-08-03 | End: 2022-08-03

## 2022-08-03 RX ORDER — METHOCARBAMOL 750 MG/1
750 TABLET, FILM COATED ORAL EVERY 8 HOURS PRN
Status: DISCONTINUED | OUTPATIENT
Start: 2022-08-03 | End: 2022-08-05 | Stop reason: HOSPADM

## 2022-08-03 RX ORDER — NAPROXEN SODIUM 220 MG/1
81 TABLET, FILM COATED ORAL 2 TIMES DAILY
Status: DISCONTINUED | OUTPATIENT
Start: 2022-08-04 | End: 2022-08-03

## 2022-08-03 RX ORDER — METOCLOPRAMIDE HYDROCHLORIDE 5 MG/ML
10 INJECTION INTRAMUSCULAR; INTRAVENOUS
Status: COMPLETED | OUTPATIENT
Start: 2022-08-03 | End: 2022-08-04

## 2022-08-03 RX ADMIN — ONDANSETRON HYDROCHLORIDE 4 MG: 2 SOLUTION INTRAMUSCULAR; INTRAVENOUS at 09:08

## 2022-08-03 RX ADMIN — TRANEXAMIC ACID 1950 MG: 650 TABLET ORAL at 06:08

## 2022-08-03 RX ADMIN — LETROZOLE 2.5 MG: 2.5 TABLET ORAL at 08:08

## 2022-08-03 RX ADMIN — MIDAZOLAM 2 MG: 1 INJECTION INTRAMUSCULAR; INTRAVENOUS at 07:08

## 2022-08-03 RX ADMIN — FENTANYL CITRATE 50 MCG: 50 INJECTION, SOLUTION INTRAMUSCULAR; INTRAVENOUS at 09:08

## 2022-08-03 RX ADMIN — KETOROLAC TROMETHAMINE 15 MG: 30 INJECTION, SOLUTION INTRAMUSCULAR at 06:08

## 2022-08-03 RX ADMIN — CEFAZOLIN SODIUM 2 G: 2 SOLUTION INTRAVENOUS at 08:08

## 2022-08-03 RX ADMIN — PHENYLEPHRINE HYDROCHLORIDE 100 MCG: 10 INJECTION INTRAVENOUS at 09:08

## 2022-08-03 RX ADMIN — CEFAZOLIN SODIUM 2 G: 2 SOLUTION INTRAVENOUS at 02:08

## 2022-08-03 RX ADMIN — METOCLOPRAMIDE 10 MG: 5 INJECTION, SOLUTION INTRAMUSCULAR; INTRAVENOUS at 08:08

## 2022-08-03 RX ADMIN — PROPOFOL 200 MG: 10 INJECTION, EMULSION INTRAVENOUS at 08:08

## 2022-08-03 RX ADMIN — ACETAMINOPHEN 500 MG: 500 TABLET ORAL at 09:08

## 2022-08-03 RX ADMIN — DEXAMETHASONE SODIUM PHOSPHATE 4 MG: 4 INJECTION, SOLUTION INTRA-ARTICULAR; INTRALESIONAL; INTRAMUSCULAR; INTRAVENOUS; SOFT TISSUE at 08:08

## 2022-08-03 RX ADMIN — KETOROLAC TROMETHAMINE 15 MG: 30 INJECTION, SOLUTION INTRAMUSCULAR at 05:08

## 2022-08-03 RX ADMIN — SUGAMMADEX 200 MG: 100 INJECTION, SOLUTION INTRAVENOUS at 09:08

## 2022-08-03 RX ADMIN — METOCLOPRAMIDE 10 MG: 5 INJECTION, SOLUTION INTRAMUSCULAR; INTRAVENOUS at 02:08

## 2022-08-03 RX ADMIN — FAMOTIDINE 20 MG: 20 TABLET ORAL at 08:08

## 2022-08-03 RX ADMIN — ROPINIROLE HYDROCHLORIDE 2 MG: 1 TABLET, FILM COATED ORAL at 08:08

## 2022-08-03 RX ADMIN — SODIUM CHLORIDE, SODIUM GLUCONATE, SODIUM ACETATE, POTASSIUM CHLORIDE AND MAGNESIUM CHLORIDE: 526; 502; 368; 37; 30 INJECTION, SOLUTION INTRAVENOUS at 07:08

## 2022-08-03 RX ADMIN — POLYETHYLENE GLYCOL 3350 17 G: 17 POWDER, FOR SOLUTION ORAL at 08:08

## 2022-08-03 RX ADMIN — GABAPENTIN 600 MG: 300 CAPSULE ORAL at 06:08

## 2022-08-03 RX ADMIN — ROCURONIUM BROMIDE 50 MG: 10 SOLUTION INTRAVENOUS at 08:08

## 2022-08-03 RX ADMIN — METOPROLOL SUCCINATE 25 MG: 25 TABLET, FILM COATED, EXTENDED RELEASE ORAL at 08:08

## 2022-08-03 RX ADMIN — ACETAMINOPHEN 1000 MG: 500 TABLET ORAL at 06:08

## 2022-08-03 RX ADMIN — SENNOSIDES AND DOCUSATE SODIUM 2 TABLET: 50; 8.6 TABLET ORAL at 08:08

## 2022-08-03 RX ADMIN — SODIUM CHLORIDE: 9 INJECTION, SOLUTION INTRAVENOUS at 12:08

## 2022-08-03 RX ADMIN — AMITRIPTYLINE HYDROCHLORIDE 50 MG: 25 TABLET, FILM COATED ORAL at 08:08

## 2022-08-03 RX ADMIN — KETOROLAC TROMETHAMINE 15 MG: 30 INJECTION, SOLUTION INTRAMUSCULAR at 11:08

## 2022-08-03 RX ADMIN — ACETAMINOPHEN 1000 MG: 10 INJECTION INTRAVENOUS at 05:08

## 2022-08-03 RX ADMIN — FENTANYL CITRATE 100 MCG: 50 INJECTION, SOLUTION INTRAMUSCULAR; INTRAVENOUS at 08:08

## 2022-08-03 RX ADMIN — PHENYLEPHRINE HYDROCHLORIDE 100 MCG: 10 INJECTION INTRAVENOUS at 08:08

## 2022-08-03 NOTE — ANESTHESIA PREPROCEDURE EVALUATION
"                                                                                                             08/03/2022  Candace Macias is a 71 y.o., female for right hip replacement.  Cardiac hx noted.  She has been seen and optimized by cardiology, LV EF 55%, she is low risk.    "   History of present illness: Candace Macias is a 71 y.o. female, presents to clinic today in regards to right hip pain.  This is found on quite some time now.  This is chronic in nature.  This is worsened over time.  Pain is located in the anterior groin.  The pain is worse with getting up from a sitting position, putting on socks and shoes, squatting.  Patient has tried conservative measures such as lifestyle modifications and over-the-counter anti-inflammatories with very little relief.  This pain is affecting her daily living activities.  She does feel as though she has responded disability and I to proceed with a right total hip arthroplasty.        Review of Systems:     Denies fevers, chills, chest pain, shortness of breath. Comprehensive review of systems performed and otherwise negative except as noted in HPI     Past Medical History:   Diagnosis Date    A-fib      Asthma      Breast cancer      Fibromyalgia      Osteoarthritis                 Past Surgical History:   Procedure Laterality Date    APPENDECTOMY        MASTECTOMY Bilateral      TOTAL HIP ARTHROPLASTY Left       PATRICK MACK   "        Pre-op Assessment    I have reviewed the Patient Summary Reports.     I have reviewed the Nursing Notes. I have reviewed the NPO Status.   I have reviewed the Medications.     Review of Systems  Anesthesia Hx:  Denies Family Hx of Anesthesia complications.   Denies Personal Hx of Anesthesia complications.   Cardiovascular:   Exercise tolerance: good Hypertension    Pulmonary:   Asthma    Musculoskeletal:   Arthritis     Neurological:   Neuromuscular Disease,        Physical Exam  General: Well " nourished, Cooperative, Alert and Oriented    Airway:  Mallampati: II   Mouth Opening: Normal  TM Distance: Normal  Tongue: Normal  Neck ROM: Normal ROM    Dental:  Intact    Chest/Lungs:  Clear to auscultation, Normal Respiratory Rate    Heart:  Rate: Normal  Rhythm: Regular Rhythm        Anesthesia Plan  Type of Anesthesia, risks & benefits discussed:    Anesthesia Type: Spinal, MAC  Intra-op Monitoring Plan: Standard ASA Monitors  Post Op Pain Control Plan: multimodal analgesia  Informed Consent: Informed consent signed with the Patient and all parties understand the risks and agree with anesthesia plan.  All questions answered.   ASA Score: 3  Day of Surgery Review of History & Physical: H&P Update referred to the surgeon/provider.    Ready For Surgery From Anesthesia Perspective.     .

## 2022-08-03 NOTE — OP NOTE
OPERATIVE REPORT      Patient: Candace Macias   : 1950    MRN: [id number]  Date: 2022      Surgeon: Osmany Kwon MD  Assistant: Alexa Phillips NP, OLIVIER.  Certified first assist was necessary as a skilled set of hands and was necessary for proper patient positioning as well as assistance with closure in place with multiple implants.  Preoperative Diagnosis:  Right hip primary osteoarthritis  Postoperative Diagnosis: Right Hip primary osteoarthritis  Procedure:  Right Reji total hip arthroplasty (30042)  Anesthesiologist: Helder Gonzáles MD  OR Staff: Circulator: Ni Fish RN  Scrub Person: ST Urmila; JUDE Hogan  Implants:   Implant Name Type Inv. Item Serial No.  Lot No. LRB No. Used Action   PIN BONE 4 X 140MM STERILE - [id number]  PIN BONE 4 X 140MM STERILE  [employer]  Right 1 Implanted and Explanted   PIN BONE 4 X 140MM STERILE - [id number]  PIN BONE 4 X 140MM STERILE  [employer]  Right 1 Implanted and Explanted   INSERT TRIDENT X3 0 DEG 36MM D - [id number]  INSERT TRIDENT X3 0 DEG 36MM D  [employer]. [id number] Right 1 Implanted   SHELL TRIDENT II CLUSTER 48MM - [id number]  SHELL TRIDENT II CLUSTER 48MM  [employer]. [id number] Right 1 Implanted   STEM ACC II 27 DEG SZ 5 - [id number]  STEM ACC II 27 DEG SZ 5  [employer]. [id number] Right 1 Implanted   HEAD V40 BIOLOX 36MM -2.5 - [id number]  HEAD V40 BIOLOX 36MM -2.5  [employer]. [id number] Right 1 Implanted     EBL: 300  Complications: None  Disposition:  To PACU stable     Indications: Candace Macias is a 71 y.o. female presenting with ongoing complaints of right hip pain.  Patient had tried and failed all conservative management including anti-inflammatories, activity modification, the use of a cane, home exercise program.  Despite this he continued to have significant complaints of right hip pain.  Patient felt as though we reached a point of  disability with regards to his right hip and would like to proceed with total hip arthroplasty.  The risks, benefits, alternatives to total hip arthroplasty were discussed in detail with the patient.  These include but are limited to infection, bleeding, scarring, need for revision surgery, included resolution of pain, DVT, PE, death.  Despite these risks, he elected to proceed with surgical intervention.  All questions answered to the patient's satisfaction.  No guarantees made.  The patient voiced understanding and written as well as verbal consent was obtained by myself prior to the procedure.    Procedure Note:  The patient was seen in the preoperative holding area.  He was marked and consented for surgery.  Again all questions were answered.  Taken the operating room was placed under anesthesia.  Placed in the left lateral decubitus position with all bony prominences well padded.  Right hip prepped draped normal sterile fashion.  A time-out was performed verifying correct patient site side and procedure.  All were in agreement.  Preoperative antibiotics were administered as well as TXA.    Three Zion pins were attached percutaneously to the iliac crest.  Intelipost robotics array was placed in appropriate position.  Standard direct superior approach to the hip was performed centered on the posterior 3rd of the greater trochanter.  Taken down through the skin and subcutaneous tissue down the fascia.  Fascia was split in line with the fibers.  Charnley retractor was placed.  Check point placed in the greater trochanter.  Piriformis tendon was identified.  Arthrotomy was made on the superior aspect of the piriformis and taken distally through the superior aspect of the short external rotators in a hockey-stick style fashion.  Hip was internally rotated and dislocated.  Sagittal saw was used to make an osteotomy 1-2 fingerbreadths proximal to the level of lesser trochanter.  The retractors placed around the acetabulum  and excellent exposure was had.  Removed the labrum as well pulvinar.  Registered the acetabulum using Reji protocol with excellent registration.  Brought in the Reji robot and reamed to a 48 Reamer.  We then impacted a 48 Trident II acetabulum component into position found to be down.   Acetabulum was impacted with 40° of inclination and 20° of anteversion.  We then impacted the liner into position and found it to be down.    Attention then turned to the femur.  Hip was internally rotated exposing the femur.  Retractors were placed.  Cookie cutter used to take out the lateral femoral neck.  Canal finder to open up the femoral canal.  We then broached sequentially up to a size 5 broach.  This came to a firm and solid stop with a change in pitch.  We then trialed at this point using a trial femoral neck and head.  Hip was taken through full range of motion and found to be stable with no anterior or posterior instability and good recreation of leg lengths as well as offset.  This was confirmed using measurements from the Reji robot.  We then dislocated the hip confirm stability of femoral broach.  We then opened the implant impacted the a 5 femoral component position.  It came down to the previous level of resection with again a firm solid stop. 36-2.5 Biolox head was opened and impacted onto the femoral trunnion after cleaning and drying the trunnion.  Hip was reduced and again taken to full range of motion.  Excellent recreation of leg length was felt and no anterior or posterior instability found.    Within turned our attention toward closure.  Wound was copiously irrigated with normal saline followed by Betadine lavage and more normal saline.  We closed the piriformis tendon as well as the short external rotators back to the greater trochanter through drill tunnels using #2 FiberWire sutures.  We injected our joint cocktail.  Fascia closed with #1 Stratafix suture in running fashion.  2-0 Monocryl on the  subcutaneous tissue followed by skin closure.    All sponge and needle counts were correct at the end of the case.  I was present and participated in all key and critical aspects of the procedure.    Prognosis:  Patient arose from anesthesia without any issues.  Was transferred to recovery room stable condition.  Postoperatively weight-bearing as tolerated to the left lower extremity with appropriate hip precautions.  Plan to discharge home versus rehab pending physical therapy evaluation.    This note/OR report was created with the assistance of  voice recognition software or phone  dictation.  There may be transcription errors as a result of using this technology however minimal. Effort has been made to assure accuracy of transcription but any obvious errors or omissions should be clarified with the author of the document.

## 2022-08-03 NOTE — PLAN OF CARE
Problem: Physical Therapy  Goal: Physical Therapy Goal  Description: Pt will improve functional independence by performing:    Bed mobility: mod I  Sit to stand: mod I with RW  Ambulation x 150' with mod I with rolling walker  1 Step (Curb): SBA with rolling walker  3 Steps: SBA with B HR  Independent with total hip HEP    Outcome: Ongoing, Progressing

## 2022-08-03 NOTE — PT/OT/SLP EVAL
"Physical Therapy Evaluation    Patient Name:  Candace Macias   MRN:  [id number]    Recommendations:     Discharge Recommendations:  outpatient PT, home   Discharge Equipment Recommendations: none   Barriers to discharge: None    Assessment:     Candace Macias is a 71 y.o. female admitted with a medical diagnosis of Primary osteoarthritis of right hip.  She presents with the following impairments/functional limitations:  weakness, impaired endurance, impaired sensation, impaired functional mobility, gait instability, impaired balance, decreased lower extremity function, pain, decreased ROM, edema, orthopedic precautions .    Rehab Prognosis: Good; patient would benefit from acute skilled PT services to address these deficits and reach maximum level of function.    Recent Surgery: Procedure(s) (LRB):  ROBOTIC ARTHROPLASTY,HIP (Right) Day of Surgery    Plan:     During this hospitalization, patient to be seen BID to address the identified rehab impairments via gait training, therapeutic activities, therapeutic exercises and progress toward the following goals:    · Plan of Care Expires:  08/09/22    Subjective     Chief Complaint: RLE numbness and decreased R ankle AROM  Patient/Family Comments/goals: "To go home"  Pain/Comfort:  · Location - Side 1: Right  · Location 1: hip  · Pain Addressed 1: Pre-medicate for activity, Reposition, Distraction, Cessation of Activity    Patients cultural, spiritual, Spiritism conflicts given the current situation: no    Living Environment:  Pt reports that she lives alone. Her home has a threshold to enter. Pt states she got a Left SUNIL ~1 year ago and remembers much of the protocol.    Prior to admission, patients level of function was independent.  Equipment used at home: none.  DME owned (not currently used): rolling walker and bedside commode.  Upon discharge, patient will have assistance from her granddaughter and daughter.  Pt reports that she " did not participate in any Prehab exercise program/HEP.    Objective:     Communicated with ZACH Soto prior to session.  Patient found supine with peripheral IV  upon PT entry to room.    General Precautions: Standard, fall   Orthopedic Precautions:RLE weight bearing as tolerated (Hip precautions)   Braces: N/A  Respiratory Status: Room air   BP: 119/70, 79bpm    Exams:  · RLE ROM: limited d/t hip pxns. Pt reported that she felt her R ankle AROM was limited; slightly decreased AROM noted compared to LLE, improved during session and pt had no issues with foot clearance during ambulation.  · RLE Strength: NT d/t surgical side  · LLE ROM: WFL  · LLE Strength: WFL    Functional Mobility:  · Bed Mobility:     · Scooting: modified independence  · Supine to Sit: stand by assistance  · Transfers:     · Sit to Stand:  contact guard assistance with rolling walker  · Gait: 115ft with RW, step through pattern, standing rest breaks as needed.      Patient left up in chair with call button in reach and Daughter present.    GOALS:   Multidisciplinary Problems     Physical Therapy Goals        Problem: Physical Therapy    Goal Priority Disciplines Outcome Goal Variances Interventions   Physical Therapy Goal     PT, PT/OT Ongoing, Progressing     Description: Pt will improve functional independence by performing:    Bed mobility: mod I  Sit to stand: mod I with RW  Ambulation x 150' with mod I with rolling walker  1 Step (Curb): SBA with rolling walker  3 Steps: SBA with B HR  Independent with total hip HEP                     History:     Past Medical History:   Diagnosis Date    A-fib     Asthma     Breast cancer     Fibromyalgia     Hypertension     Osteoarthritis        Past Surgical History:   Procedure Laterality Date    APPENDECTOMY      MASTECTOMY Bilateral     TOTAL HIP ARTHROPLASTY Left     PATRICK MACK       Time Tracking:     PT Received On:    PT Start Time: 1320     PT Stop Time: 1351  PT Total Time (min): 31 min      Billable Minutes: Evaluation 31      08/03/2022

## 2022-08-03 NOTE — PLAN OF CARE
Problem: Adult Inpatient Plan of Care  Goal: Plan of Care Review  Outcome: Ongoing, Progressing  Goal: Patient-Specific Goal (Individualized)  Outcome: Ongoing, Progressing  Goal: Absence of Hospital-Acquired Illness or Injury  Outcome: Ongoing, Progressing  Goal: Optimal Comfort and Wellbeing  Outcome: Ongoing, Progressing  Goal: Readiness for Transition of Care  Outcome: Ongoing, Progressing     Problem: Infection  Goal: Absence of Infection Signs and Symptoms  Outcome: Ongoing, Progressing     Problem: Asthma Comorbidity  Goal: Maintenance of Asthma Control  Outcome: Ongoing, Progressing     Problem: Hypertension Comorbidity  Goal: Blood Pressure in Desired Range  Outcome: Ongoing, Progressing     Problem: Adjustment to Surgery (Knee Arthroplasty)  Goal: Optimal Coping  Outcome: Ongoing, Progressing     Problem: Bleeding (Knee Arthroplasty)  Goal: Absence of Bleeding  Outcome: Ongoing, Progressing     Problem: Bowel Motility Impaired (Knee Arthroplasty)  Goal: Effective Bowel Elimination  Outcome: Ongoing, Progressing     Problem: Fluid and Electrolyte Imbalance (Knee Arthroplasty)  Goal: Fluid and Electrolyte Balance  Outcome: Ongoing, Progressing     Problem: Functional Ability Impaired (Knee Arthroplasty)  Goal: Optimal Functional Ability  Outcome: Ongoing, Progressing     Problem: Neurovascular Compromise (Knee Arthroplasty)  Goal: Intact Neurovascular Status  Outcome: Ongoing, Progressing     Problem: Ongoing Anesthesia Effects (Knee Arthroplasty)  Goal: Anesthesia/Sedation Recovery  Outcome: Ongoing, Progressing     Problem: Pain (Knee Arthroplasty)  Goal: Acceptable Pain Control  Outcome: Ongoing, Progressing     Problem: Postoperative Nausea and Vomiting (Knee Arthroplasty)  Goal: Nausea and Vomiting Relief  Outcome: Ongoing, Progressing     Problem: Postoperative Urinary Retention (Knee Arthroplasty)  Goal: Effective Urinary Elimination  Outcome: Ongoing, Progressing     Problem: Respiratory Compromise  (Knee Arthroplasty)  Goal: Effective Oxygenation and Ventilation  Outcome: Ongoing, Progressing     Problem: VTE (Venous Thromboembolism)  Goal: VTE (Venous Thromboembolism) Symptom Resolution  Outcome: Ongoing, Progressing

## 2022-08-03 NOTE — TRANSFER OF CARE
"Anesthesia Transfer of Care Note    Patient: Candace Macias    Procedure(s) Performed: Procedure(s) (LRB):  ROBOTIC ARTHROPLASTY,HIP (Right)    Patient location: PACU    Anesthesia Type: general    Transport from OR: Transported from OR on room air with adequate spontaneous ventilation    Post pain: adequate analgesia    Post assessment: no apparent anesthetic complications and tolerated procedure well    Post vital signs: stable    Level of consciousness: lethargic    Nausea/Vomiting: no nausea/vomiting    Complications: none    Transfer of care protocol was followed      Last vitals:   Visit Vitals  BP (!) 156/93   Pulse 80   Temp 36.1 °C (97 °F) (Tympanic)   Resp 18   Ht 5' 9" (1.753 m)   Wt 96.9 kg (213 lb 10 oz)   SpO2 96%   Breastfeeding No   BMI 31.55 kg/m²     "

## 2022-08-04 LAB
ANION GAP SERPL CALC-SCNC: 9 MEQ/L
BUN SERPL-MCNC: 14.4 MG/DL (ref 9.8–20.1)
CALCIUM SERPL-MCNC: 8.3 MG/DL (ref 8.4–10.2)
CHLORIDE SERPL-SCNC: 108 MMOL/L (ref 98–107)
CO2 SERPL-SCNC: 23 MMOL/L (ref 23–31)
CREAT SERPL-MCNC: 0.79 MG/DL (ref 0.55–1.02)
CREAT/UREA NIT SERPL: 18
ERYTHROCYTE [DISTWIDTH] IN BLOOD BY AUTOMATED COUNT: 14 % (ref 11.5–17)
GLUCOSE SERPL-MCNC: 140 MG/DL (ref 82–115)
HCT VFR BLD AUTO: 35.9 % (ref 37–47)
HGB BLD-MCNC: 11.4 GM/DL (ref 12–16)
MCH RBC QN AUTO: 29.7 PG (ref 27–31)
MCHC RBC AUTO-ENTMCNC: 31.8 MG/DL (ref 33–36)
MCV RBC AUTO: 93.5 FL (ref 80–94)
NRBC BLD AUTO-RTO: 0 %
PLATELET # BLD AUTO: 225 X10(3)/MCL (ref 130–400)
PMV BLD AUTO: 9.6 FL (ref 7.4–10.4)
POCT GLUCOSE: 98 MG/DL (ref 70–110)
POTASSIUM SERPL-SCNC: 4.3 MMOL/L (ref 3.5–5.1)
RBC # BLD AUTO: 3.84 X10(6)/MCL (ref 4.2–5.4)
SODIUM SERPL-SCNC: 140 MMOL/L (ref 136–145)
WBC # SPEC AUTO: 8.5 X10(3)/MCL (ref 4.5–11.5)

## 2022-08-04 PROCEDURE — 97116 GAIT TRAINING THERAPY: CPT | Mod: CQ

## 2022-08-04 PROCEDURE — 80048 BASIC METABOLIC PNL TOTAL CA: CPT | Performed by: ORTHOPAEDIC SURGERY

## 2022-08-04 PROCEDURE — 11000001 HC ACUTE MED/SURG PRIVATE ROOM

## 2022-08-04 PROCEDURE — 25000003 PHARM REV CODE 250: Performed by: ORTHOPAEDIC SURGERY

## 2022-08-04 PROCEDURE — 97166 OT EVAL MOD COMPLEX 45 MIN: CPT

## 2022-08-04 PROCEDURE — 85027 COMPLETE CBC AUTOMATED: CPT | Performed by: ORTHOPAEDIC SURGERY

## 2022-08-04 PROCEDURE — 25000003 PHARM REV CODE 250: Performed by: NURSE PRACTITIONER

## 2022-08-04 PROCEDURE — 94799 UNLISTED PULMONARY SVC/PX: CPT

## 2022-08-04 PROCEDURE — 97110 THERAPEUTIC EXERCISES: CPT

## 2022-08-04 PROCEDURE — 97530 THERAPEUTIC ACTIVITIES: CPT | Mod: CQ

## 2022-08-04 PROCEDURE — 36415 COLL VENOUS BLD VENIPUNCTURE: CPT | Performed by: ORTHOPAEDIC SURGERY

## 2022-08-04 PROCEDURE — 63600175 PHARM REV CODE 636 W HCPCS: Performed by: ORTHOPAEDIC SURGERY

## 2022-08-04 PROCEDURE — 94761 N-INVAS EAR/PLS OXIMETRY MLT: CPT

## 2022-08-04 PROCEDURE — 97535 SELF CARE MNGMENT TRAINING: CPT

## 2022-08-04 RX ORDER — TIZANIDINE 4 MG/1
4 TABLET ORAL NIGHTLY
Status: DISCONTINUED | OUTPATIENT
Start: 2022-08-04 | End: 2022-08-05 | Stop reason: HOSPADM

## 2022-08-04 RX ORDER — METHOCARBAMOL 750 MG/1
750 TABLET, FILM COATED ORAL EVERY 6 HOURS
Qty: 56 TABLET | Refills: 0 | Status: SHIPPED | OUTPATIENT
Start: 2022-08-04 | End: 2022-08-18

## 2022-08-04 RX ORDER — PREGABALIN 50 MG/1
50 CAPSULE ORAL NIGHTLY
Status: DISCONTINUED | OUTPATIENT
Start: 2022-08-04 | End: 2022-08-05 | Stop reason: HOSPADM

## 2022-08-04 RX ADMIN — FAMOTIDINE 20 MG: 20 TABLET ORAL at 09:08

## 2022-08-04 RX ADMIN — METOCLOPRAMIDE 10 MG: 5 INJECTION, SOLUTION INTRAMUSCULAR; INTRAVENOUS at 01:08

## 2022-08-04 RX ADMIN — TRAMADOL HYDROCHLORIDE 50 MG: 50 TABLET, COATED ORAL at 08:08

## 2022-08-04 RX ADMIN — TRAMADOL HYDROCHLORIDE 50 MG: 50 TABLET, COATED ORAL at 12:08

## 2022-08-04 RX ADMIN — FAMOTIDINE 20 MG: 20 TABLET ORAL at 08:08

## 2022-08-04 RX ADMIN — METOCLOPRAMIDE 10 MG: 5 INJECTION, SOLUTION INTRAMUSCULAR; INTRAVENOUS at 08:08

## 2022-08-04 RX ADMIN — AMITRIPTYLINE HYDROCHLORIDE 50 MG: 25 TABLET, FILM COATED ORAL at 09:08

## 2022-08-04 RX ADMIN — ACETAMINOPHEN 500 MG: 500 TABLET ORAL at 01:08

## 2022-08-04 RX ADMIN — LETROZOLE 2.5 MG: 2.5 TABLET ORAL at 09:08

## 2022-08-04 RX ADMIN — ACETAMINOPHEN 500 MG: 500 TABLET ORAL at 10:08

## 2022-08-04 RX ADMIN — KETOROLAC TROMETHAMINE 15 MG: 30 INJECTION, SOLUTION INTRAMUSCULAR at 12:08

## 2022-08-04 RX ADMIN — PREGABALIN 50 MG: 50 CAPSULE ORAL at 09:08

## 2022-08-04 RX ADMIN — TRAMADOL HYDROCHLORIDE 50 MG: 50 TABLET, COATED ORAL at 06:08

## 2022-08-04 RX ADMIN — SENNOSIDES AND DOCUSATE SODIUM 2 TABLET: 50; 8.6 TABLET ORAL at 08:08

## 2022-08-04 RX ADMIN — SENNOSIDES AND DOCUSATE SODIUM 2 TABLET: 50; 8.6 TABLET ORAL at 09:08

## 2022-08-04 RX ADMIN — TRAMADOL HYDROCHLORIDE 50 MG: 50 TABLET, COATED ORAL at 04:08

## 2022-08-04 RX ADMIN — RIVAROXABAN 20 MG: 10 TABLET, FILM COATED ORAL at 08:08

## 2022-08-04 RX ADMIN — POLYETHYLENE GLYCOL 3350 17 G: 17 POWDER, FOR SOLUTION ORAL at 09:08

## 2022-08-04 RX ADMIN — ACETAMINOPHEN 500 MG: 500 TABLET ORAL at 05:08

## 2022-08-04 RX ADMIN — ACETAMINOPHEN 500 MG: 500 TABLET ORAL at 04:08

## 2022-08-04 RX ADMIN — TIZANIDINE 4 MG: 4 TABLET ORAL at 09:08

## 2022-08-04 RX ADMIN — ACETAMINOPHEN 500 MG: 500 TABLET ORAL at 09:08

## 2022-08-04 RX ADMIN — DOCUSATE SODIUM 200 MG: 100 CAPSULE, LIQUID FILLED ORAL at 05:08

## 2022-08-04 RX ADMIN — CEFAZOLIN SODIUM 2 G: 2 SOLUTION INTRAVENOUS at 01:08

## 2022-08-04 RX ADMIN — ROPINIROLE HYDROCHLORIDE 2 MG: 1 TABLET, FILM COATED ORAL at 05:08

## 2022-08-04 RX ADMIN — METOPROLOL SUCCINATE 25 MG: 25 TABLET, FILM COATED, EXTENDED RELEASE ORAL at 09:08

## 2022-08-04 RX ADMIN — KETOROLAC TROMETHAMINE 15 MG: 30 INJECTION, SOLUTION INTRAMUSCULAR at 05:08

## 2022-08-04 NOTE — PROGRESS NOTES
"No acute events overnight.  Pain controlled.  Resting in bed. Ambulated well    Vital Signs  Temp: 98.8 °F (37.1 °C)  Temp src: Oral  Pulse: 78  Heart Rate Source: Monitor  Resp: 18  SpO2: 95 %  Pulse Oximetry Type: Intermittent  Flow (L/min): 2  Oxygen Concentration (%): 80  O2 Device (Oxygen Therapy): room air  BP: 106/64  BP Location: Left arm  BP Method: Automatic  Patient Position: Sitting  Height and Weight  Height: 5' 9.02" (175.3 cm)  Height Method: Stated  Weight: 96.9 kg (213 lb 10 oz)  Weight Method: Bed Scale  BSA (Calculated - sq m): 2.17 sq meters  BMI (Calculated): 31.5  Weight in (lb) to have BMI = 25: 169]    +FHL/EHL  +GS.  No TA this AM  BCR distally  Dressing c/d/i  SILT distally    Recent Lab Results       08/04/22  0524        Anion Gap 9.0       BUN 14.4       BUN/CREAT RATIO 18       Calcium 8.3       Chloride 108       CO2 23       Creatinine 0.79       eGFR if non African American >60       Glucose 140       Hematocrit 35.9       Hemoglobin 11.4       MCH 29.7       MCHC 31.8       MCV 93.5       MPV 9.6       nRBC 0.0       Platelets 225       Potassium 4.3       RBC 3.84       RDW 14.0       Sodium 140       WBC 8.5             A/P:  Status post R SUNIL with post op foot drop  Pain controlled  Overall patient doing well.  Therapy for mobility and ambulation.  xarelto for DVT PPx  Patient with partial peroneal palsy - no TA, intact EHL and sensation  Will support her foot when ambulating - should return  "

## 2022-08-04 NOTE — PLAN OF CARE
Problem: Occupational Therapy  Goal: Occupational Therapy Goal  Description: Pt will perform Walk in shower transfer using RW within pxns at A by d/c. MET  Pt will doff/don footwear, including AFO, SBA by d/c.  8/4/2022 1408 by Deirdre Zuniga OT  Outcome: Ongoing, Progressing

## 2022-08-04 NOTE — PLAN OF CARE
Problem: Adult Inpatient Plan of Care  Goal: Plan of Care Review  Outcome: Ongoing, Progressing  Goal: Patient-Specific Goal (Individualized)  Outcome: Ongoing, Progressing  Goal: Absence of Hospital-Acquired Illness or Injury  Outcome: Ongoing, Progressing  Goal: Optimal Comfort and Wellbeing  Outcome: Ongoing, Progressing  Goal: Readiness for Transition of Care  Outcome: Ongoing, Progressing     Problem: Infection  Goal: Absence of Infection Signs and Symptoms  Outcome: Ongoing, Progressing     Problem: Asthma Comorbidity  Goal: Maintenance of Asthma Control  Outcome: Ongoing, Progressing     Problem: Hypertension Comorbidity  Goal: Blood Pressure in Desired Range  Outcome: Ongoing, Progressing     Problem: Adjustment to Surgery (Knee Arthroplasty)  Goal: Optimal Coping  Outcome: Ongoing, Progressing     Problem: Bleeding (Knee Arthroplasty)  Goal: Absence of Bleeding  Outcome: Ongoing, Progressing     Problem: Bowel Motility Impaired (Knee Arthroplasty)  Goal: Effective Bowel Elimination  Outcome: Ongoing, Progressing     Problem: Fluid and Electrolyte Imbalance (Knee Arthroplasty)  Goal: Fluid and Electrolyte Balance  Outcome: Ongoing, Progressing     Problem: Functional Ability Impaired (Knee Arthroplasty)  Goal: Optimal Functional Ability  Outcome: Ongoing, Progressing     Problem: Neurovascular Compromise (Knee Arthroplasty)  Goal: Intact Neurovascular Status  Outcome: Ongoing, Progressing     Problem: Ongoing Anesthesia Effects (Knee Arthroplasty)  Goal: Anesthesia/Sedation Recovery  Outcome: Ongoing, Progressing     Problem: Pain (Knee Arthroplasty)  Goal: Acceptable Pain Control  Outcome: Ongoing, Progressing     Problem: Postoperative Nausea and Vomiting (Knee Arthroplasty)  Goal: Nausea and Vomiting Relief  Outcome: Ongoing, Progressing     Problem: Postoperative Urinary Retention (Knee Arthroplasty)  Goal: Effective Urinary Elimination  Outcome: Ongoing, Progressing     Problem: Respiratory Compromise  (Knee Arthroplasty)  Goal: Effective Oxygenation and Ventilation  Outcome: Ongoing, Progressing     Problem: VTE (Venous Thromboembolism)  Goal: VTE (Venous Thromboembolism) Symptom Resolution  Outcome: Ongoing, Progressing      No

## 2022-08-04 NOTE — PT/OT/SLP PROGRESS
"Physical Therapy Treatment    Patient Name:  Candace Macias   MRN:  [id number]    Recommendations:     Discharge Recommendations:  outpatient PT   Discharge Equipment Recommendations: walker, rolling   Barriers to discharge: None    Assessment:     Candace Macias is a 71 y.o. female admitted with a medical diagnosis of Primary osteoarthritis of right hip.  She presents with the following impairments/functional limitations:  impaired functional mobility, orthopedic precautions, pain, decreased safety awareness, impaired muscle length, impaired endurance, impaired balance, weakness, gait instability, decreased ROM.    Rehab Prognosis: Good; patient would benefit from acute skilled PT services to address these deficits and reach maximum level of function.    Recent Surgery: Procedure(s) (LRB):  ROBOTIC ARTHROPLASTY,HIP (Right) 1 Day Post-Op    Plan:     During this hospitalization, patient to be seen BID to address the identified rehab impairments via gait training, therapeutic activities, therapeutic exercises and progress toward the following goals:    · Plan of Care Expires:  08/09/22    Subjective     Chief Complaint: "my foot feel so heavy"  "my leg is very swollen"  "I am so tired. I have been up since 3 AM"  Patient/Family Comments/goals:   Pain/Comfort:  · Pain Rating 1: 5/10  · Location - Side 1: Right  · Location 1: hip  · Pain Addressed 1: Pre-medicate for activity, Reposition  · Pain Rating Post-Intervention 1: 5/10      Objective:     Communicated with RN prior to session.  Patient found up in chair with Other (comments) (n/a) upon PT entry to room.     General Precautions: Standard, fall   Orthopedic Precautions:RLE weight bearing as tolerated   Braces: N/A  Respiratory Status: Room air     Functional Mobility:  · N/a  · Pt found sitting up in chair at end of OT session, awaiting for PT arrival. OT ambulated pt ~200' with RW to/from shower for transfer " training      Therapeutic Activities and Exercises:    Pt performed 1x10 of total hip HEP, handout provided      Patient left up in chair head reclined and LE's elevated with CP to L ankle to reduce swelling in L LE.     GOALS:   Multidisciplinary Problems     Physical Therapy Goals        Problem: Physical Therapy    Goal Priority Disciplines Outcome Goal Variances Interventions   Physical Therapy Goal     PT, PT/OT Ongoing, Progressing     Description: Pt will improve functional independence by performing:    Bed mobility: mod I  (MET 8/4)Sit to stand: mod I with RW  Ambulation x 150' with mod I with rolling walker  (MET 8/4)1 Step (Curb): SBA with rolling walker  (MET 8/4)3 Steps: SBA with B HR  Independent with total hip HEP                     Time Tracking:     PT Received On:    PT Start Time: 1410     PT Stop Time: 1423  PT Total Time (min): 13 min     Billable Minutes: Therapeutic Exercise 13    Treatment Type: Treatment  PT/PTA: PT           08/04/2022

## 2022-08-04 NOTE — PT/OT/SLP PROGRESS
Occupational Therapy   Treatment    Name: Candace Macias  MRN: [id number]  Admitting Diagnosis:  Primary osteoarthritis of right hip  1 Day Post-Op    Recommendations:     Discharge Recommendations: outpatient PT  Discharge Equipment Recommendations:  none, other (see comments) (pt has everything from previous SUNIL and will not need new AE for home)  Barriers to discharge:  Other (Comment) (awaiting AFO fitting and training on techniques to dof/don footwear)    Assessment:     Candace Macias is a 71 y.o. female with a medical diagnosis of Primary osteoarthritis of right hip.  She presents with sleepiness, but willing to participate. Performance deficits affecting function are decreased ROM, decreased lower extremity function, orthopedic precautions.     Rehab Prognosis:  Good; patient would benefit from acute skilled OT services to address these deficits and reach maximum level of function.       Plan:     Patient to be seen  (BID) to address the above listed problems via self-care/home management, therapeutic activities  · Plan of Care Expires: 08/05/22  · Plan of Care Reviewed with: patient    Subjective     Pain/Comfort:  · Pain Rating 1: 3/10  · Location - Side 1: Right  · Location 1: hip  · Pain Addressed 1: Pre-medicate for activity, Other (see comments) (ice pack applied at end of session)    Objective:     Communicated with: nrsg prior to session.  Patient found up in chair with   upon OT entry to room.    General Precautions: Standard, fall   Orthopedic Precautions:RLE weight bearing as tolerated   Braces: N/A (awaiting AFO)  Respiratory Status: Room air     Occupational Performance:       Functional Mobility/Transfers:  · Patient completed Sit <> Stand Transfer with modified independence  with  rolling walker   · Patient completed  Shower Transfer Step Transfer technique with supervision with rolling walker  · Functional Mobility: mobilized with slowed sanchez and caution d/t  R foot drop.   · edu pt on fall prevention including transfer on dry, nonslip surfaces, technique to use RW t/o shower transfer and remaining seated with shower while using LH sponge.       Treatment & Education:  As named above. edu on fall prevention and self-care techniques within pxns.     Patient left up in chair with all lines intact and call button in reachEducation:      GOALS:   Multidisciplinary Problems     Occupational Therapy Goals        Problem: Occupational Therapy    Goal Priority Disciplines Outcome Interventions   Occupational Therapy Goal     OT, PT/OT Ongoing, Progressing    Description: Pt will perform Walk in shower transfer using RW within pxns at SBA by d/c. MET  Pt will doff/don footwear, including AFO, SBA by d/c.                   Time Tracking:     OT Date of Treatment: 08/04/22  OT Start Time: 1325  OT Stop Time: 1355  OT Total Time (min): 30 min    Billable Minutes:Self Care/Home Management 30    OT/SARINA: OT          8/4/2022

## 2022-08-04 NOTE — PT/OT/SLP EVAL
Occupational Therapy   Evaluation    Name: Candace Macias  MRN: [id number]  Admitting Diagnosis:  Primary osteoarthritis of right hip  Recent Surgery: Procedure(s) (LRB):  ROBOTIC ARTHROPLASTY,HIP (Right) 1 Day Post-Op    Recommendations:     Discharge Recommendations: outpatient PT, home with home health  Discharge Equipment Recommendations:  none, other (see comments) (pt has everything from previous [facility] and will not need new AE for home)  Barriers to discharge:  None    Assessment:     Candace Macias is a 71 y.o. female with a medical diagnosis of Primary osteoarthritis of right hip.  She presents with good motivation to return to [facility]. Performance deficits affecting function: impaired sensation, gait instability, decreased lower extremity function, decreased ROM, orthopedic precautions.      Rehab Prognosis: Good; patient would benefit from acute skilled OT services to address these deficits and reach maximum level of function.       Plan:     Patient to be seen  (BID) to address the above listed problems via self-care/home management, therapeutic activities  · Plan of Care Expires: 08/04/22  · Plan of Care Reviewed with: patient    Subjective     Chief Complaint: numbness and decreased mobility of R ankle  Patient/Family Comments/goals[place]    Occupational Profile:  Living Environment: 1-level home, WIS shower  Previous level of function: Ind ADLs, IADLs  Roles and Routines: self-care, , shopping, leisure, travel  Equipment Used at Home:  none  Assistance upon Discharge: granddaughter will be present in the evenings    Pain/Comfort:  · Pain Rating 1: 2/10  · Location - Side 1: Right  · Location 1: hip  · Pain Addressed 1: Pre-medicate for activity, Reposition    Patients cultural, spiritual, Advent conflicts given the current situation: no    Objective:     Communicated with: nrsg prior to session.  Patient found up in  chair with   upon OT entry to room.    General Precautions: Standard, fall   Orthopedic Precautions:RLE weight bearing as tolerated, RLE posterior precautions   Braces: N/A  Respiratory Status: Room air    Occupational Performance:      Functional Mobility/Transfers:  · Patient completed Sit <> Stand Transfer with modified independence  with  rolling walker    · Toilet t/f: Mod I  · Functional Mobility: mobilized ~100 ft with RW t/o session, cues for step length and increased hip flex to compensate for decreased ankle mobility.   · Car t/f: SBA d/t min cues required for technique within pxns.     Activities of Daily Living:  · Lower Body Dressing: modified independence able to recall and perform techniques using AD    Cognitive/Visual Perceptual:  WFL    Physical Exam:  Upper Extremity Range of Motion:     -       Right Upper Extremity: WFL  -       Left Upper Extremity: WFL  Upper Extremity Strength:    -       Right Upper Extremity: WFL  -       Left Upper Extremity: WFL    Treatment & Education:  edu on techniques of self-care and mobility within pxns. Pt able to safely demo. Will need one more OT sessions to address WIS goal.   Education:    Patient left up in chair with all lines intact and call button in reach    GOALS:   Multidisciplinary Problems     Occupational Therapy Goals        Problem: Occupational Therapy    Goal Priority Disciplines Outcome Interventions   Occupational Therapy Goal     OT, PT/OT Ongoing, Progressing    Description: Pt will perform Walk in shower transfer using RW within pxns at [facility] by d/c.                   History:     Past Medical History:   Diagnosis Date    A-fib     Asthma     Breast cancer     Fibromyalgia     Hypertension     Osteoarthritis        Past Surgical History:   Procedure Laterality Date    APPENDECTOMY      MASTECTOMY Bilateral     ROBOTIC ARTHROPLASTY, HIP Right 8/3/2022    Procedure: ROBOTIC ARTHROPLASTY,HIP;  Surgeon: Osmany Kwon MD;  Location:  [facility] OR;  Service: Orthopedics;  Laterality: Right;    TOTAL HIP ARTHROPLASTY Left     PATRICK GIRON       Time Tracking:     OT Date of Treatment: 08/04/22  OT Start Time: 0837  OT Stop Time: 0915  OT Total Time (min): 38 min    Billable Minutes:Evaluation 20  Self Care/Home Management 18     Addendum:  Added mod eval to flow sheet as per billable minutes listed above.  Donna, DENISHA    8/4/2022

## 2022-08-04 NOTE — DISCHARGE INSTRUCTIONS
[facility]  [address] [place] [address] [id number]  [place] [address]  Phone [phone]       /      Fax [phone]  SURGEON: Dr. Kwon    After discharge, all questions or concerns should be handled at your surgeon's office ([phone]). If it is a weekend or after hours, you will get the surgeon on call.     Discharge Medications:    PAIN MANAGEMENT: Next Dose Available           Tylenol/Acetaminophen 500mg- every 4 hours, around the clock (WHILE AWAKE)   230pm   Robaxin/Methocarbamol 750mg (Muscle Relaxer) - Every 6-8 hours AS NEEDED for muscle spasms, thigh pain or additional pain control When needed       Ultram/Tramadol 50mg (Pain Med) - every 4-6 hours AS NEEDED for pain 4pm                   COMPLICATION PREVENTION MEDS: Next Dose DUE   Xarelto 20mg daily, as prior to surgery for post-op for blood clot prevention. Stagger out dose until you get back to a bedtime dose.    8/6/22 - 3pm  8/7/22 - 7pm  8/8/22 - 10pm   MiraLAX 17gm or Senokot S - once or twice a day while on narcotics and muscle relaxers for constipation prevention PM on 8/5/22                     Total Hip Replacement      PAIN MEDICATIONS/PAIN MANAGEMENT:   Tylenol/Acetaminophen 500mg every 4 hours, around the clock (WHILE AWAKE).   Take Ultram (Tramadol) 50mg (pain pill) every 4-6 hours as needed for pain.    **NO MORE THAN 3000mg OF TYLENOL IN 24 HOURS**.     Robaxin/Methocarbamol 750mg (muscle relaxer)- you can take every 6-8 hours as needed for muscle spasms, thigh pain and stiffness, additional pain control or breakthrough pain medications. This medication is helpful for pain control while lessening your need for narcotics. Please reduce the use gradually as the pain and spasms lessen. DO NOT TAKE AT THE SAME TIME AS A PAIN PILL. YOU WILL BE BETTER SERVED WITH 2 HOURS BETWEEN PAIN PILL AND MUSCLE RELAXER.     Use the medication log in your discharge packet to keep track of your medications.      **Other things  that help with pain control is WALKING, COMPRESSION WRAP, ICE and ELEVATION!!**      BLOOD CLOT PREVENTION:   Restart Xarelto 20mg,  as you were taking Prior to surgery. Stagger out dose until you get back to a bedtime dose.   8/6/22 - 3pm  8/7/22 - 7pm  8/8/22 - 10pm  You need to continuing wearing your compression stocking (LINO Hose - ThromboEmbolic Disease Prevention Device) for the next 2-6 weeks post-op. It is ok to remove them for hygiene and at bedtime.   Hand wash and Dry. **If the swelling persists in the legs after you stop wearing the Lino hose, continue to wear them until the swelling decreases.**  REMOVE STOCKINGS AT LEAST DAILY FOR SKIN ASSESSMENT.   Do NOT let the stockings roll down, creating a tourniquet around the back of your knee. If you need to, leave the excess at the bottom of the stocking.   The best thing you can do to prevent blood clots is to walk around as much as possible, AT LEAST EVERY 1-2 HOURS.       CONSTIPATION PREVENTION:   Miralax or Senokot S/Alma-Colace and Stool softeners EVERY DAY while on pain meds.  Use other more aggressive over the counter LAXATIVES as needed for constipation (Examples: Milk of Magnesia, Dulcolax tabs or suppository, Magnesium Citrate, Fleet's Enema...etc.)   Drink lots of water.  Increase Fiber in diet.  Increase walking distance each day  DO NOT GO TOO LONG WITHOUT HAVING A BOWEL MOVEMENT!      ACTIVITY:  You will be FULL weight-bearing on your operative leg.  Please do not take yourself off of the walker too soon.  You will allow your outpatient therapist or surgeon to guide you.  Walk around at least every 1-2 hours while awake.  No heavy lifting, pulling, pushing or straining and follow hip precautions as instructed.  No twisting, leaning past 90° or crossing the legs for 4-6 weeks post-op.  AFO when in and out of bed.       THERAPY  Home Health Physical therapy at least 3 times per week.  After 2 weeks, transition to outpatient physical  therapy.    WOUND CARE:   Dry dressing changes every other day and as needed for soiling for 14 days. Start {TIME; DAYS OF WEEK:63334}  DO NOT WET WOUND or apply any ointments, creams, lotions or antiseptics.  Ace wrap - apply your compression stocking and apply the ace wrap where the stocking stops for extra added compression to the knee.   May wet incision after you follow-up with your surgeon.   Ok to shower before then if able to keep wound from getting wet (plastic barrier, saran wrap or cling wrap and tape).   DO NOT TOUCH INCISION  Apply Ice to the Knee and thigh as much as possible.      URINARY RETENTION:  If you start having difficulty urinating, decrease the use of Pain pills and muscle relaxers and notify your primary care doctor.     PNEUMONIA PREVENTION:  Stay out of bed as much as possible and walk around every 1-2 hours.  Continue breathing exercises (Incentive Spirometry) every 1-2 hours while mobility is limited and while you are on pain pills.    INFECTION PREVENTION:  Proper handwashing before and after dressing changes. Do not wet the wound. Wound care instructions as written above. NOTIFY MD OF EXCESSIVE WOUND DRAINAGE.  No alcohol, smoking or tobacco products  Pets should not be allowed around the wound or the dressing.   Treat UTI and skin infections as soon as possible.  Pre-medicate with antibiotics prior to dental or surgical procedures.   If you are diabetic, MAINTAIN GOOD BLOOD SUGAR CONTROL (Below 150) DURING YOUR RECOVERY. If you see high numbers, notify your primary care doctor.     Call your SURGEON'S OFFICE ([phone]) if you experience the following signs and symptoms of infection:   Unusual redness, swelling, excessive, cloudy or foul smelling drainage at the incision site.   Persistent low grade temp OR a temp greater than 102 F, unrelieved by Tylenol  Pain at surgical site, unrelieved by pain meds    Warning signs of a blood clot in your leg: (CALL YOUR SURGEON)  New onset or  increasing pain in calf, new onset tenderness or redness above or below the knee or increasing swelling of your calf, ankle, or foot.  Warning signs that a blood clot has traveled to your lungs: (REPORT TO THE ER/CALL 911)  Sudden or increase in Shortness of breath, sudden onset of chest pains, or  Localized chest pain with coughing.         IF ANY ISSUES ARISE AND YOU FEEL THE NEED TO CALL YOUR PRIMARY CARE DOCTOR, PLEASE LET YOUR SURGEON KNOW AS WELL.     For emergencies, please report to OUR ([facility] or [facility] [place]) Emergency department and tell them to call YOUR SURGEON at [phone].     BEFORE MAKING ANY CHANGES TO THE MEDICAL CARE PLAN OR GOING TO THE EMERGENCY ROOM, PLEASE CONTACT THE SURGEON.    3rd floor nursing unit # [phone]  Use this number for questions about your discharge instructions or problems filling your discharge prescriptions.

## 2022-08-04 NOTE — PLAN OF CARE
Problem: Physical Therapy  Goal: Physical Therapy Goal  Description: Pt will improve functional independence by performing:    Bed mobility: mod I  (MET 8/4)Sit to stand: mod I with RW  Ambulation x 150' with mod I with rolling walker  (MET 8/4)1 Step (Curb): SBA with rolling walker  (MET 8/4)3 Steps: SBA with B HR  Independent with total hip HEP    Outcome: Ongoing, Progressing

## 2022-08-04 NOTE — PLAN OF CARE
Problem: Occupational Therapy  Goal: Occupational Therapy Goal  Description: Pt will perform Walk in shower transfer using RW within pxns at [facility] by d/c.  Outcome: Ongoing, Progressing

## 2022-08-04 NOTE — ANESTHESIA POSTPROCEDURE EVALUATION
Anesthesia Post Evaluation    Patient: Candace Macias    Procedure(s) Performed: Procedure(s) (LRB):  ROBOTIC ARTHROPLASTY,HIP (Right)    Final Anesthesia Type: general      Patient location during evaluation: PACU  Patient participation: Yes- Able to Participate  Level of consciousness: awake and alert  Post-procedure vital signs: reviewed and stable  Pain management: adequate  Airway patency: patent      Anesthetic complications: no      Cardiovascular status: blood pressure returned to baseline  Respiratory status: unassisted  Hydration status: euvolemic  Follow-up not needed.          Vitals Value Taken Time   /64 08/04/22 1109   Temp 37.1 °C (98.8 °F) 08/04/22 1109   Pulse 78 08/04/22 1109   Resp 18 08/04/22 1237   SpO2 95 % 08/04/22 1109         Event Time   Out of Recovery 10:40:00         Pain/Kirstin Score: Pain Rating Prior to Med Admin: 2 (8/4/2022 12:37 PM)  Pain Rating Post Med Admin: 2 (8/4/2022  5:33 AM)  Kirstin Score: 8 (8/3/2022 10:00 AM)

## 2022-08-04 NOTE — PT/OT/SLP PROGRESS
Physical Therapy Treatment    Patient Name:  Candace Macias   MRN:  [id number]    Recommendations:     Discharge Recommendations:  outpatient PT, home   Discharge Equipment Recommendations: none   Barriers to discharge: None    Assessment:     Canadce Macias is a 71 y.o. female admitted with a medical diagnosis of Primary osteoarthritis of right hip.  She presents with the following impairments/functional limitations:  weakness, impaired endurance, impaired functional mobility, gait instability, impaired sensation, decreased lower extremity function, pain, decreased ROM, impaired fine motor, orthopedic precautions .    Rehab Prognosis: Good; patient would benefit from acute skilled PT services to address these deficits and reach maximum level of function.    Recent Surgery: Procedure(s) (LRB):  ROBOTIC ARTHROPLASTY,HIP (Right) 1 Day Post-Op    Plan:     During this hospitalization, patient to be seen BID to address the identified rehab impairments via gait training, therapeutic activities, therapeutic exercises and progress toward the following goals:    · Plan of Care Expires:  08/09/22    Subjective     Chief Complaint: numbness to rt foot/ankle nursing/MD aware  Patient/Family Comments/goals: to move better   Pain/Comfort:  · Pain Rating 1: 5/10  · Location - Side 1: Right  · Location 1: hip  · Pain Addressed 1: Pre-medicate for activity, Nurse notified, Other (see comments) (of pt c/o numbness to rt foot/ankle area cold pack pt rt hip after tx)      Objective:     Communicated with nursing  prior to session.  Patient found up in chair with Other (comments) (in recliner) upon PT entry to room.     General Precautions: Standard, fall   Orthopedic Precautions:RLE weight bearing as tolerated (rt hip pxs)   Braces:    Respiratory Status: Room air     Functional Mobility:  · Transfers:     · Sit to Stand:  independence with rolling walker  · Toilet Transfer: independence with  rolling  "walker  using  Step Transfer and post void   · Gait: pt amb use of rw rtle wbat 350ft with vc for step through gt pattern   · Stairs:  Pt ascended/descended 3 stair(s) and 4" curb step with Rolling Walker with bilateral handrails with Supervision or Set-up Assistance.    · Reviewed rt hip pxs with pt able to recall and reviewed exercises HEP per [facility]    Patient left up in chair with call button in reach and nursing  notified..of pts verbalized concerns about rt foot/ankle numbness     GOALS:   Multidisciplinary Problems     Physical Therapy Goals        Problem: Physical Therapy    Goal Priority Disciplines Outcome Goal Variances Interventions   Physical Therapy Goal     PT, PT/OT Ongoing, Progressing     Description: Pt will improve functional independence by performing:    Bed mobility: mod I  (MET 8/4)Sit to stand: mod I with RW  Ambulation x 150' with mod I with rolling walker  (MET 8/4)1 Step (Curb): SBA with rolling walker  (MET 8/4)3 Steps: SBA with B HR  Independent with total hip HEP                     Time Tracking:     PT Received On: 08/04/22  PT Start Time: 1000     PT Stop Time: 1025  PT Total Time (min): 25 min     Billable Minutes: Gait Training 15min and Therapeutic Activity 10min    Treatment Type: Treatment  PT/PTA: PTA           08/04/2022  "

## 2022-08-05 VITALS
DIASTOLIC BLOOD PRESSURE: 75 MMHG | OXYGEN SATURATION: 96 % | WEIGHT: 213.63 LBS | HEART RATE: 79 BPM | SYSTOLIC BLOOD PRESSURE: 130 MMHG | TEMPERATURE: 98 F | BODY MASS INDEX: 31.64 KG/M2 | RESPIRATION RATE: 16 BRPM | HEIGHT: 69 IN

## 2022-08-05 LAB
ANION GAP SERPL CALC-SCNC: 7 MEQ/L
BUN SERPL-MCNC: 13.4 MG/DL (ref 9.8–20.1)
CALCIUM SERPL-MCNC: 8.5 MG/DL (ref 8.4–10.2)
CHLORIDE SERPL-SCNC: 109 MMOL/L (ref 98–107)
CO2 SERPL-SCNC: 26 MMOL/L (ref 23–31)
CREAT SERPL-MCNC: 0.69 MG/DL (ref 0.55–1.02)
CREAT/UREA NIT SERPL: 19
ERYTHROCYTE [DISTWIDTH] IN BLOOD BY AUTOMATED COUNT: 14.2 % (ref 11.5–17)
GLUCOSE SERPL-MCNC: 96 MG/DL (ref 82–115)
HCT VFR BLD AUTO: 33.9 % (ref 37–47)
HGB BLD-MCNC: 10.6 GM/DL (ref 12–16)
MCH RBC QN AUTO: 29.8 PG (ref 27–31)
MCHC RBC AUTO-ENTMCNC: 31.3 MG/DL (ref 33–36)
MCV RBC AUTO: 95.2 FL (ref 80–94)
NRBC BLD AUTO-RTO: 0 %
PLATELET # BLD AUTO: 203 X10(3)/MCL (ref 130–400)
PMV BLD AUTO: 9.6 FL (ref 7.4–10.4)
POTASSIUM SERPL-SCNC: 4.8 MMOL/L (ref 3.5–5.1)
RBC # BLD AUTO: 3.56 X10(6)/MCL (ref 4.2–5.4)
SODIUM SERPL-SCNC: 142 MMOL/L (ref 136–145)
WBC # SPEC AUTO: 6.2 X10(3)/MCL (ref 4.5–11.5)

## 2022-08-05 PROCEDURE — 94799 UNLISTED PULMONARY SVC/PX: CPT

## 2022-08-05 PROCEDURE — 25000003 PHARM REV CODE 250: Performed by: ORTHOPAEDIC SURGERY

## 2022-08-05 PROCEDURE — 36415 COLL VENOUS BLD VENIPUNCTURE: CPT | Performed by: ORTHOPAEDIC SURGERY

## 2022-08-05 PROCEDURE — 94761 N-INVAS EAR/PLS OXIMETRY MLT: CPT

## 2022-08-05 PROCEDURE — 25000003 PHARM REV CODE 250: Performed by: NURSE PRACTITIONER

## 2022-08-05 PROCEDURE — 97535 SELF CARE MNGMENT TRAINING: CPT

## 2022-08-05 PROCEDURE — 97116 GAIT TRAINING THERAPY: CPT

## 2022-08-05 PROCEDURE — 97110 THERAPEUTIC EXERCISES: CPT

## 2022-08-05 PROCEDURE — 80048 BASIC METABOLIC PNL TOTAL CA: CPT | Performed by: ORTHOPAEDIC SURGERY

## 2022-08-05 PROCEDURE — 85027 COMPLETE CBC AUTOMATED: CPT | Performed by: ORTHOPAEDIC SURGERY

## 2022-08-05 RX ORDER — TRAMADOL HYDROCHLORIDE 50 MG/1
50 TABLET ORAL EVERY 4 HOURS PRN
Qty: 28 TABLET | Refills: 0 | Status: SHIPPED | OUTPATIENT
Start: 2022-08-05

## 2022-08-05 RX ADMIN — FAMOTIDINE 20 MG: 20 TABLET ORAL at 08:08

## 2022-08-05 RX ADMIN — TRAMADOL HYDROCHLORIDE 50 MG: 50 TABLET, COATED ORAL at 12:08

## 2022-08-05 RX ADMIN — ACETAMINOPHEN 500 MG: 500 TABLET ORAL at 10:08

## 2022-08-05 RX ADMIN — ACETAMINOPHEN 500 MG: 500 TABLET ORAL at 05:08

## 2022-08-05 RX ADMIN — TRAMADOL HYDROCHLORIDE 50 MG: 50 TABLET, COATED ORAL at 08:08

## 2022-08-05 RX ADMIN — METHOCARBAMOL 750 MG: 750 TABLET ORAL at 02:08

## 2022-08-05 RX ADMIN — SENNOSIDES AND DOCUSATE SODIUM 2 TABLET: 50; 8.6 TABLET ORAL at 08:08

## 2022-08-05 RX ADMIN — RIVAROXABAN 20 MG: 10 TABLET, FILM COATED ORAL at 12:08

## 2022-08-05 NOTE — PT/OT/SLP PROGRESS
"Physical Therapy Treatment    Patient Name:  Candace Macias   MRN:  [id number]    Recommendations:     Discharge Recommendations:  outpatient PT   Discharge Equipment Recommendations: walker, rolling   Barriers to discharge: None    Assessment:     Candace Macias is a 71 y.o. female admitted with a medical diagnosis of Primary osteoarthritis of right hip.  She presents with the following impairments/functional limitations:  impaired functional mobility, orthopedic precautions, pain, decreased safety awareness, impaired muscle length, impaired endurance, impaired balance, weakness, gait instability, decreased ROM.    Rehab Prognosis: Good; patient would benefit from acute skilled PT services to address these deficits and reach maximum level of function.    Recent Surgery: Procedure(s) (LRB):  ROBOTIC ARTHROPLASTY,HIP (Right) 2 Days Post-Op    Plan:     During this hospitalization, patient to be seen BID to address the identified rehab impairments via gait training, therapeutic activities, therapeutic exercises and progress toward the following goals:    · Plan of Care Expires:  08/09/22    Subjective     Chief Complaint:   Patient/Family Comments/goals:   Pain/Comfort:  · Pain Rating 1: 10/10  · Location - Side 1: Right  · Location 1: hip  · Pain Addressed 1: Pre-medicate for activity, Cessation of Activity, Nurse notified, Reposition, Distraction (CP to R hip at end of session)  · Pain Rating Post-Intervention 1: 8/10    Discussed pain scale with patient as pt was ambulating with SBA stating her pain was 10/10. Pt adamant that her pain was 10/10 today, but stated "its okay, it will get better, I do not want to take anything stronger". Pt able to complete session without increased c/o pain, stating a decrease in pain with ambulation/mobility of R hip.    Objective:     Communicated with RN prior to session.  Patient found in chair with Other (comments) (none) upon PT entry to room. "     General Precautions: Standard, fall   Orthopedic Precautions:RLE weight bearing as tolerated   Braces: N/A  Respiratory Status: Room air     Functional Mobility:  · Transfers:     · Sit to Stand:  supervision with rolling walker  · Gait: 150' with RW and mod I-SBA at times, minor LOB pt able to self correct   · Pt continues to demo drop foot on R LE, but able to clear during ambulation without assistance      Therapeutic Activities and Exercises:    Pt performed 1x10 of total hip HEP, handout provided      Patient left up in chair with RN notified, call bell in reach    GOALS:   Multidisciplinary Problems     Physical Therapy Goals     Not on file          Multidisciplinary Problems (Resolved)        Problem: Physical Therapy    Goal Priority Disciplines Outcome Goal Variances Interventions   Physical Therapy Goal   (Resolved)     PT, PT/OT Met     Description: Pt will improve functional independence by performing:    Bed mobility: mod I  (MET 8/4)Sit to stand: mod I with RW  MET 08/05/2022 Ambulation x 150' with mod I with rolling walker  (MET 8/4)1 Step (Curb): SBA with rolling walker  (MET 8/4)3 Steps: SBA with B HR  Independent with total hip HEP                     Time Tracking:     PT Received On:    PT Start Time: 1057     PT Stop Time: 1120  PT Total Time (min): 23 min     Billable Minutes: Gait Training 12 and Therapeutic Exercise 11    Treatment Type: Treatment (discharge)  PT/PTA: PT           Pt tolerated session well, educated on safety awareness, proper hydration, HEP, mobility, and pain management strategies to reduce risk of medical complications upon d/c home. All questions/concerns addressed. This note to serve as d/c summary.      08/05/2022

## 2022-08-05 NOTE — PLAN OF CARE
Problem: Adult Inpatient Plan of Care  Goal: Plan of Care Review  Outcome: Met  Goal: Patient-Specific Goal (Individualized)  Outcome: Met  Goal: Absence of Hospital-Acquired Illness or Injury  Outcome: Met  Goal: Optimal Comfort and Wellbeing  Outcome: Met  Goal: Readiness for Transition of Care  Outcome: Met     Problem: Infection  Goal: Absence of Infection Signs and Symptoms  Outcome: Met     Problem: Asthma Comorbidity  Goal: Maintenance of Asthma Control  Outcome: Met     Problem: Hypertension Comorbidity  Goal: Blood Pressure in Desired Range  Outcome: Met     Problem: Adjustment to Surgery (Knee Arthroplasty)  Goal: Optimal Coping  Outcome: Met     Problem: Bleeding (Knee Arthroplasty)  Goal: Absence of Bleeding  Outcome: Met     Problem: Bowel Motility Impaired (Knee Arthroplasty)  Goal: Effective Bowel Elimination  Outcome: Met     Problem: Fluid and Electrolyte Imbalance (Knee Arthroplasty)  Goal: Fluid and Electrolyte Balance  Outcome: Met     Problem: Functional Ability Impaired (Knee Arthroplasty)  Goal: Optimal Functional Ability  Outcome: Met     Problem: Neurovascular Compromise (Knee Arthroplasty)  Goal: Intact Neurovascular Status  Outcome: Met     Problem: Ongoing Anesthesia Effects (Knee Arthroplasty)  Goal: Anesthesia/Sedation Recovery  Outcome: Met     Problem: Pain (Knee Arthroplasty)  Goal: Acceptable Pain Control  Outcome: Met     Problem: Postoperative Nausea and Vomiting (Knee Arthroplasty)  Goal: Nausea and Vomiting Relief  Outcome: Met     Problem: Postoperative Urinary Retention (Knee Arthroplasty)  Goal: Effective Urinary Elimination  Outcome: Met     Problem: Respiratory Compromise (Knee Arthroplasty)  Goal: Effective Oxygenation and Ventilation  Outcome: Met     Problem: VTE (Venous Thromboembolism)  Goal: VTE (Venous Thromboembolism) Symptom Resolution  Outcome: Met

## 2022-08-05 NOTE — PLAN OF CARE
Problem: Physical Therapy  Goal: Physical Therapy Goal  Description: Pt will improve functional independence by performing:    Bed mobility: mod I  (MET 8/4)Sit to stand: mod I with RW  MET 08/05/2022 Ambulation x 150' with mod I with rolling walker  (MET 8/4)1 Step (Curb): SBA with rolling walker  (MET 8/4)3 Steps: SBA with B HR  Independent with total hip HEP    Outcome: Met

## 2022-08-05 NOTE — PLAN OF CARE
Problem: Occupational Therapy  Goal: Occupational Therapy Goal  Description:   Pt will perform Walk in shower transfer using RW within pxns at [facility] by d/c. MET    Pt will doff/don footwear, including AFO, SBA by d/c. = N/A as AFO will not be ready until Monday 8/8/22. Pt voiced her family and/or friend will be available to don/doff for pt until SUNIL precautions are cleared.  Outcome: Met

## 2022-08-05 NOTE — PT/OT/SLP PROGRESS
OB Occupational Therapy   Treatment    Name: Candace Macias  MRN: [id number]  Admitting Diagnosis:  Primary osteoarthritis of right hip  2 Days Post-Op    Recommendations:     Discharge Recommendations: outpatient PT  Discharge Equipment Recommendations:  bedside commode, walker, rolling  Barriers to discharge:  None    Assessment:     Candace Macias is a 71 y.o. female with a medical diagnosis of Primary osteoarthritis of right hip. Performance deficits affecting function are pain.     Rehab Prognosis:  Good; patient would benefit from acute skilled OT services to address these deficits and reach maximum level of function.       Plan:     Patient to be seen  (BID) to address the above listed problems via self-care/home management, therapeutic activities  · Plan of Care Expires: 08/05/22  · Plan of Care Reviewed with: patient    Subjective     Pain/Comfort:  · Pain Rating 1: 4/10  · Location - Side 1: Right  · Location 1: hip  · Pain Addressed 1: Pre-medicate for activity  · Pain Rating Post-Intervention 1: 4/10    Objective:     Communicated with: RN prior to session.  Patient found up in chair with   upon OT entry to room.    General Precautions: Standard, fall   Orthopedic Precautions:RLE weight bearing as tolerated   Braces: N/A (AFO to be picked up on Monday (5/8))  Respiratory Status: Room air     Occupational Performance:     Functional Mobility/Transfers:  · Patient completed Sit <> Stand Transfer with modified independence  with  rolling walker   · Patient completed Toilet Transfer Step Transfer technique with modified independence with  rolling walker  · Functional Mobility: Short FM in/out bathroom with mod I and RW. Pt ambulated very safely with increased mindfulness of R drop foot.     Pt and RN Brittany voiced the AFO will not be ready until Monday. Pt voiced her brother will bring her to  to get her AFO. Pt also voiced she will have someone spending the night at her  home every night. OT strongly encouraged her to have the family member/friend who is sleeping over to don it for the patient before leaving for the day the next morning, as donning/doffing the AFO while adhering to SUNIL precautions may be difficult for the patient. Pt voiced agreement and understanding.    Activities of Daily Living:  · Toileting: modified independence        [facility] 6 Click ADL:      Treatment & Education:  Pt has met all OT short-term goals. OT deems pt does not warrant OT services as a result. RN Brittany notified. This note is to serve as pt's discharge summary.    Patient left up in chair with call button in reachEducation:      GOALS:   Multidisciplinary Problems     Occupational Therapy Goals        Problem: Occupational Therapy    Goal Priority Disciplines Outcome Interventions   Occupational Therapy Goal     OT, PT/OT Ongoing, Progressing    Description:   Pt will perform Walk in shower transfer using RW within pxns at SBA by d/c. MET    Pt will doff/don footwear, including AFO, SBA by d/c. = N/A as AFO will not be ready until Monday 8/8/22. Pt voiced her family and/or friend will be available to don/doff for pt until SUNIL precautions are cleared.                   Time Tracking:     OT Date of Treatment: 08/05/22  OT Start Time: 0858  OT Stop Time: 0907  OT Total Time (min): 9 min    Billable Minutes:Self Care/Home Management 9 8/5/2022

## 2022-08-05 NOTE — PROGRESS NOTES
"No acute events overnight.  Pain controlled.  Resting in bed.     Vital Signs  Temp: 98.8 °F (37.1 °C)  Temp src: Oral  Pulse: 82  Heart Rate Source: Monitor  Resp: 18  SpO2: 96 %  Pulse Oximetry Type: Intermittent  Flow (L/min): 2  Oxygen Concentration (%): 80  O2 Device (Oxygen Therapy): room air  BP: 128/75  BP Location: Left arm  BP Method: Automatic  Patient Position: Sitting  Height and Weight  Height: 5' 9.02" (175.3 cm)  Height Method: Stated  Weight: 96.9 kg (213 lb 10 oz)  Weight Method: Bed Scale  BSA (Calculated - sq m): 2.17 sq meters  BMI (Calculated): 31.5  Weight in (lb) to have BMI = 25: 169]    +FHL/EHL  Ongoing foot drop - TA fires  BCR distally  Dressing c/d/i  SILT distally      Recent Lab Results       08/05/22  0528        Anion Gap 7.0       BUN 13.4       BUN/CREAT RATIO 19       Calcium 8.5       Chloride 109       CO2 26       Creatinine 0.69       eGFR if non African American >60       Glucose 96       Hematocrit 33.9       Hemoglobin 10.6       MCH 29.8       MCHC 31.3       MCV 95.2       MPV 9.6       nRBC 0.0       Platelets 203       Potassium 4.8       RBC 3.56       RDW 14.2       Sodium 142       WBC 6.2             A/P:  Status post R SUNIL  Pain controlled  Overall patient doing well.  Therapy for mobility and ambulation.  DVT PPx    "

## 2022-08-06 ENCOUNTER — PATIENT OUTREACH (OUTPATIENT)
Dept: ADMINISTRATIVE | Facility: CLINIC | Age: 72
End: 2022-08-06
Payer: MEDICARE

## 2022-08-06 LAB — VIEW PATHOLOGY REPORT (RELIAPATH): NORMAL

## 2022-08-06 NOTE — PROGRESS NOTES
C3 nurse attempted to contact Candace Macias for a TCC post hospital discharge follow up call. No answer. Left voicemail with callback information. The patient has a scheduled HOSFU appointment with Dr. Kwon on 08/19/2022 @ 1000 am.

## 2022-08-08 PROCEDURE — G0180 PR HOME HEALTH MD CERTIFICATION: ICD-10-PCS | Mod: ,,, | Performed by: ORTHOPAEDIC SURGERY

## 2022-08-08 PROCEDURE — G0180 MD CERTIFICATION HHA PATIENT: HCPCS | Mod: ,,, | Performed by: ORTHOPAEDIC SURGERY

## 2022-08-08 NOTE — PROGRESS NOTES
C3 nurse spoke with Candace Macias for a TCC post hospital discharge follow up call.  The patient has a scheduled HOSFU appointment with Dr. Kwon on 08/19/2022 @ 1000 am.   Advised to call Jamari Larsen MD to schedule hospital follow up appointment; verbalized understanding.

## 2022-08-12 NOTE — DISCHARGE SUMMARY
[facility]  Discharge Summary      Admit Date: 8/3/2022    Discharge Date and Time: 8/5/2022 12:34 PM    Attending Physician: Ai att. providers found     Reason for Admission: Primary osteoarthritis right hip    Procedures Performed: Procedure(s) (LRB):  ROBOTIC ARTHROPLASTY,HIP (Right)    Hospital Course Patient seen in clinic with complaints of right hip pain. Tried and failed all conservative measures including activity modification, anti-inflammatories, and injections. Patient felt as though they have reached a point of disability with regards to right hip pain. Risk, benefits, and alternatives discussed for right total hip arthroplasty. Despite these risks, the patient wished to proceed with surgical intervention.    Patient admitted as an outpatient. Seen preoperatively by anesthesia and cleared for surgery. Patient was then taken to the OR and a right total hip arthroplasty was performed. For full details please see dictated operative note. Patient tolerated the procedure without any issues and was transferred to the floor postoperatively. Physical therapy began working with the patient on postop day 1 and patient was made weightbearing as tolerated to affected extremity. Patient progressed well with physical therapy and eventually cleared all physical therapy guidelines. Patient's pain and vitals remained stable throughout hospitalization. Wound was checked and found to be clean, dry, and intact. At this point the patient was deemed suitable to discharge home.    Goals of Care Treatment Preferences:  Code Status: Full Code      Consults: PT    Significant Diagnostic Studies:   Specimen (24h ago, onward)            None          Final Diagnoses:    Principal Problem: Primary osteoarthritis of right hip   Secondary Diagnoses:   Active Hospital Problems    Diagnosis  POA    *Primary osteoarthritis of right hip [M16.11]  Yes      Resolved Hospital Problems   No resolved  "problems to display.       Discharged Condition: good    Disposition: Home or Self Care    Follow Up/Patient Instructions:     Medications:  Reconciled Home Medications:      Medication List      START taking these medications    methocarbamoL 750 MG Tab  Commonly known as: ROBAXIN  Take 1 tablet (750 mg total) by mouth every 6 (six) hours. for 14 days        CHANGE how you take these medications    traMADoL 50 mg tablet  Commonly known as: ULTRAM  Take 1 tablet (50 mg total) by mouth every 4 (four) hours as needed (as needed for pain score 1-5).  What changed:   · when to take this  · reasons to take this        CONTINUE taking these medications    amitriptyline 50 MG tablet  Commonly known as: ELAVIL  Take 50 mg by mouth nightly.     ascorbic acid (vitamin C) 1000 MG tablet  Commonly known as: VITAMIN C  Take 1,000 mg by mouth once daily.     bisacodyL 5 mg EC tablet  Commonly known as: DULCOLAX  Take 10 mg by mouth daily as needed.     letrozole 2.5 mg Tab  Commonly known as: FEMARA  Take 2.5 mg by mouth nightly.     metoprolol succinate 25 MG 24 hr tablet  Commonly known as: TOPROL-XL  Take 25 mg by mouth every evening.     pregabalin 50 MG capsule  Commonly known as: LYRICA  Take 50 mg by mouth every evening.     rOPINIRole 1 MG tablet  Commonly known as: REQUIP  Take 2 mg by mouth nightly.     tiZANidine 4 MG tablet  Commonly known as: ZANAFLEX  Take 4 mg by mouth nightly as needed.     vitamin D 1000 units Tab  Commonly known as: VITAMIN D3  Take 1,000 Units by mouth once daily.     vitamin E 400 UNIT capsule  Take 400 Units by mouth.     XARELTO 20 mg Tab  Generic drug: rivaroxaban  Take 20 mg by mouth once daily. Last dose to be on Sunday July 31st.          Discharge Procedure Orders   ANKLE FOOT ORTHOSIS FOR HOME USE     Order Specific Question Answer Comments   Type of AFO to be filled? Articulating AFO (Dorsiflexion assist) Right foot   Height: 5' 9.02" (1.753 m)    Weight: 96.9 kg (213 lb 10 oz)  "   Does patient have medical equipment at home? bedside commode    Does patient have medical equipment at home? walker, rolling    Does patient have medical equipment at home? hip kit    Does patient have medical equipment at home? shower chair    Does patient have medical equipment at home? rollator      Ambulatory referral/consult to Home Health   Standing Status: Future   Referral Priority: Routine Referral Type: Home Health   Referral Reason: Specialty Services Required   Requested Specialty: Home Health Services   Number of Visits Requested: 1      Follow-up Information     Osmany Kwon MD. Go on 8/19/2022.    Specialty: Orthopedic Surgery  Why: Ortho follow up appt on Friday 8/19/22 @ 10:00am.  Contact information:  [address]  [address]  [facility] [address]  [phone]             [facility] Follow up.    Why: This is home health agency. Home health will provide therapy & dressing changes for 2 weeks.  Call if you have questions or concerns.  Contact information:  [address] [facility] [id number] [phone]             [facility] Follow up.    Specialty: Physical Therapy  Why: This is the outpatient therapy facility-- to start after home health therapy., They will contact pt with appt date & time. Call if you have questions or concerns.  Contact information:  [address]   [address]  [facility] [address]  [phone]

## 2022-08-18 ENCOUNTER — EXTERNAL HOME HEALTH (OUTPATIENT)
Dept: HOME HEALTH SERVICES | Facility: HOSPITAL | Age: 72
End: 2022-08-18
Payer: MEDICARE

## 2022-08-19 ENCOUNTER — HOSPITAL ENCOUNTER (OUTPATIENT)
Dept: RADIOLOGY | Facility: CLINIC | Age: 72
Discharge: HOME | End: 2022-08-19
Attending: ORTHOPAEDIC SURGERY
Payer: MEDICARE

## 2022-08-19 ENCOUNTER — OFFICE VISIT (OUTPATIENT)
Dept: ORTHOPEDICS | Facility: CLINIC | Age: 72
End: 2022-08-19
Payer: MEDICARE

## 2022-08-19 VITALS
HEART RATE: 72 BPM | BODY MASS INDEX: 31.55 KG/M2 | SYSTOLIC BLOOD PRESSURE: 139 MMHG | DIASTOLIC BLOOD PRESSURE: 78 MMHG | WEIGHT: 213 LBS | HEIGHT: 69 IN

## 2022-08-19 DIAGNOSIS — Z47.1 AFTERCARE FOLLOWING RIGHT HIP JOINT REPLACEMENT SURGERY: Primary | ICD-10-CM

## 2022-08-19 DIAGNOSIS — Z96.641 AFTERCARE FOLLOWING RIGHT HIP JOINT REPLACEMENT SURGERY: Primary | ICD-10-CM

## 2022-08-19 DIAGNOSIS — Z96.641 AFTERCARE FOLLOWING RIGHT HIP JOINT REPLACEMENT SURGERY: ICD-10-CM

## 2022-08-19 DIAGNOSIS — Z47.1 AFTERCARE FOLLOWING RIGHT HIP JOINT REPLACEMENT SURGERY: ICD-10-CM

## 2022-08-19 PROCEDURE — 99024 PR POST-OP FOLLOW-UP VISIT: ICD-10-PCS | Mod: POP,,, | Performed by: ORTHOPAEDIC SURGERY

## 2022-08-19 PROCEDURE — 73502 X-RAY EXAM HIP UNI 2-3 VIEWS: CPT | Mod: RT,,, | Performed by: ORTHOPAEDIC SURGERY

## 2022-08-19 PROCEDURE — 73502 XR HIP WITH PELVIS WHEN PERFORMED, 2 OR 3  VIEWS RIGHT: ICD-10-PCS | Mod: RT,,, | Performed by: ORTHOPAEDIC SURGERY

## 2022-08-19 PROCEDURE — 99024 POSTOP FOLLOW-UP VISIT: CPT | Mod: POP,,, | Performed by: ORTHOPAEDIC SURGERY

## 2022-08-19 RX ORDER — GABAPENTIN 300 MG/1
300 CAPSULE ORAL NIGHTLY
Qty: 30 CAPSULE | Refills: 11 | Status: SHIPPED | OUTPATIENT
Start: 2022-08-19 | End: 2023-08-19

## 2022-08-19 NOTE — PROGRESS NOTES
Past Medical History:   Diagnosis Date    A-fib     Asthma     Breast cancer     Fibromyalgia     Hypertension     Osteoarthritis        Past Surgical History:   Procedure Laterality Date    APPENDECTOMY      MASTECTOMY Bilateral     ROBOTIC ARTHROPLASTY, HIP Right 8/3/2022    Procedure: ROBOTIC ARTHROPLASTY,HIP;  Surgeon: Osmany Kwon MD;  Location: [facility];  Service: Orthopedics;  Laterality: Right;    TOTAL HIP ARTHROPLASTY Left     PATRICK GIRON       Current Outpatient Medications   Medication Sig    amitriptyline (ELAVIL) 50 MG tablet Take 50 mg by mouth nightly.    ascorbic acid, vitamin C, (VITAMIN C) 1000 MG tablet Take 1,000 mg by mouth once daily.    bisacodyL (DULCOLAX) 5 mg EC tablet Take 10 mg by mouth daily as needed.    letrozole (FEMARA) 2.5 mg Tab Take 2.5 mg by mouth nightly.    metoprolol succinate (TOPROL-XL) 25 MG 24 hr tablet Take 25 mg by mouth every evening.    pregabalin (LYRICA) 50 MG capsule Take 50 mg by mouth every evening.    rOPINIRole (REQUIP) 1 MG tablet Take 2 mg by mouth nightly.    tiZANidine (ZANAFLEX) 4 MG tablet Take 4 mg by mouth nightly as needed.    traMADoL (ULTRAM) 50 mg tablet Take 1 tablet (50 mg total) by mouth every 4 (four) hours as needed (as needed for pain score 1-5).    vitamin D (VITAMIN D3) 1000 units Tab Take 1,000 Units by mouth once daily.    vitamin E 400 UNIT capsule Take 400 Units by mouth.    XARELTO 20 mg Tab Take 20 mg by mouth once daily. Last dose to be on Sunday July 31st.     No current facility-administered medications for this visit.       Review of patient's allergies indicates:   Allergen Reactions    Loratadine-pseudoephedrine Rash and Shortness Of Breath     Other reaction(s): hives, pruritis       History reviewed. No pertinent family history.    Social History     Socioeconomic History    Marital status:    Tobacco Use    Smoking status: Never Smoker    Smokeless tobacco: Never Used   Substance and Sexual  Activity    Alcohol use: Yes     Alcohol/week: 1.0 standard drink     Types: 1 Glasses of wine per week     Comment: occas    Drug use: Never    Sexual activity: Not Currently       Chief Complaint:   Chief Complaint   Patient presents with    Hip Pain     right THR sx 8/3/22, patient states she only has pain in the morning & when she is turning her body, but no pain when sitting.        History of present illness: 71-year-old female presents for follow-up after her right total hip arthroplasty.  Patient is 2 weeks out.  She is using a cane.  Pain is controlled the hip.  Does note some pain distally although minimal.      Review of Systems:    Constitution: Negative for chills, fever, and sweats.  Negative for unexplained weight loss.    HENT:  Negative for headaches and blurry vision.    Cardiovascular:Negative for chest pain or irregular heart beat. Negative for hypertension.    Respiratory:  Negative for cough and shortness of breath.    Gastrointestinal: Negative for abdominal pain, heartburn, melena, nausea, and vomitting.    Genitourinary:  Negative bladder incontinence and dysuria.    Musculoskeletal:  See HPI    Neurological: Negative for numbness.    Psychiatric/Behavioral: Negative for depression.  The patient is not nervous/anxious.      Endocrine: Negative for polyuria    Hematologic/Lymphatic: Negative for bleeding problem.  Does not bruise/bleed easily.    Skin: Negative for poor would healing and rash      Physical Examination:    Vital Signs:    Vitals:    08/19/22 1006   BP: 139/78   Pulse: 72       Body mass index is 31.44 kg/m².    Right hip:  Range of motion right without significant pain or discomfort.  Patient has a positive EHL.  Continues to have no active ankle extension.  Does have a flicker of tib ant today    X-rays:  Three views right hip taken and reviewed.  Patient's implants appear well fixed.  No signs of loosening or subsidence noted     Assessment::  Status post right total hip  arthroplasty    Plan:  Continue AFO for postoperative footdrop.  She is going to work on exercising and stretching her Achilles.  We will see her back in a month repeat x-rays of her right hip.  Will add some Neurontin at night for restless legs.    This note was created using [employer] voice recognition software that occasionally misinterpreted phrases or words.    Consult note is delivered via Epic messaging service.

## 2022-08-26 DIAGNOSIS — Z47.1 AFTERCARE FOLLOWING RIGHT HIP JOINT REPLACEMENT SURGERY: Primary | ICD-10-CM

## 2022-08-26 DIAGNOSIS — Z96.641 AFTERCARE FOLLOWING RIGHT HIP JOINT REPLACEMENT SURGERY: Primary | ICD-10-CM

## 2022-08-30 ENCOUNTER — DOCUMENT SCAN (OUTPATIENT)
Dept: HOME HEALTH SERVICES | Facility: HOSPITAL | Age: 72
End: 2022-08-30
Payer: MEDICARE

## 2022-09-29 ENCOUNTER — HOSPITAL ENCOUNTER (OUTPATIENT)
Dept: RADIOLOGY | Facility: CLINIC | Age: 72
Discharge: HOME | End: 2022-09-29
Attending: ORTHOPAEDIC SURGERY
Payer: MEDICARE

## 2022-09-29 ENCOUNTER — OFFICE VISIT (OUTPATIENT)
Dept: ORTHOPEDICS | Facility: CLINIC | Age: 72
End: 2022-09-29
Payer: MEDICARE

## 2022-09-29 VITALS
WEIGHT: 213 LBS | HEART RATE: 67 BPM | HEIGHT: 69 IN | BODY MASS INDEX: 31.55 KG/M2 | DIASTOLIC BLOOD PRESSURE: 77 MMHG | SYSTOLIC BLOOD PRESSURE: 130 MMHG

## 2022-09-29 DIAGNOSIS — Z96.641 AFTERCARE FOLLOWING RIGHT HIP JOINT REPLACEMENT SURGERY: Primary | ICD-10-CM

## 2022-09-29 DIAGNOSIS — Z47.1 AFTERCARE FOLLOWING RIGHT HIP JOINT REPLACEMENT SURGERY: Primary | ICD-10-CM

## 2022-09-29 DIAGNOSIS — Z96.641 AFTERCARE FOLLOWING RIGHT HIP JOINT REPLACEMENT SURGERY: ICD-10-CM

## 2022-09-29 DIAGNOSIS — Z47.1 AFTERCARE FOLLOWING RIGHT HIP JOINT REPLACEMENT SURGERY: ICD-10-CM

## 2022-09-29 PROCEDURE — 99024 PR POST-OP FOLLOW-UP VISIT: ICD-10-PCS | Mod: POP,,, | Performed by: ORTHOPAEDIC SURGERY

## 2022-09-29 PROCEDURE — 99024 POSTOP FOLLOW-UP VISIT: CPT | Mod: POP,,, | Performed by: ORTHOPAEDIC SURGERY

## 2022-09-29 PROCEDURE — 73502 X-RAY EXAM HIP UNI 2-3 VIEWS: CPT | Mod: RT,,, | Performed by: ORTHOPAEDIC SURGERY

## 2022-09-29 PROCEDURE — 73502 XR HIP WITH PELVIS WHEN PERFORMED, 2 OR 3  VIEWS RIGHT: ICD-10-PCS | Mod: RT,,, | Performed by: ORTHOPAEDIC SURGERY

## 2022-09-29 NOTE — PROGRESS NOTES
Past Medical History:   Diagnosis Date    A-fib     Asthma     Breast cancer     Fibromyalgia     Hypertension     Osteoarthritis        Past Surgical History:   Procedure Laterality Date    APPENDECTOMY      MASTECTOMY Bilateral     ROBOTIC ARTHROPLASTY, HIP Right 8/3/2022    Procedure: ROBOTIC ARTHROPLASTY,HIP;  Surgeon: Osmany Kwon MD;  Location: [facility];  Service: Orthopedics;  Laterality: Right;    TOTAL HIP ARTHROPLASTY Left     PATRICK GIRON       Current Outpatient Medications   Medication Sig    amitriptyline (ELAVIL) 50 MG tablet Take 50 mg by mouth nightly.    ascorbic acid, vitamin C, (VITAMIN C) 1000 MG tablet Take 1,000 mg by mouth once daily.    bisacodyL (DULCOLAX) 5 mg EC tablet Take 10 mg by mouth daily as needed.    gabapentin (NEURONTIN) 300 MG capsule Take 1 capsule (300 mg total) by mouth every evening.    letrozole (FEMARA) 2.5 mg Tab Take 2.5 mg by mouth nightly.    metoprolol succinate (TOPROL-XL) 25 MG 24 hr tablet Take 25 mg by mouth every evening.    pregabalin (LYRICA) 50 MG capsule Take 50 mg by mouth every evening.    rOPINIRole (REQUIP) 1 MG tablet Take 2 mg by mouth nightly.    tiZANidine (ZANAFLEX) 4 MG tablet Take 4 mg by mouth nightly as needed.    traMADoL (ULTRAM) 50 mg tablet Take 1 tablet (50 mg total) by mouth every 4 (four) hours as needed (as needed for pain score 1-5).    vitamin D (VITAMIN D3) 1000 units Tab Take 1,000 Units by mouth once daily.    vitamin E 400 UNIT capsule Take 400 Units by mouth.    XARELTO 20 mg Tab Take 20 mg by mouth once daily. Last dose to be on Sunday July 31st.     No current facility-administered medications for this visit.       Review of patient's allergies indicates:   Allergen Reactions    Loratadine-pseudoephedrine Rash and Shortness Of Breath     Other reaction(s): hives, pruritis       History reviewed. No pertinent family history.    Social History     Socioeconomic History    Marital status:    Tobacco Use    Smoking status:  Never    Smokeless tobacco: Never   Substance and Sexual Activity    Alcohol use: Yes     Alcohol/week: 1.0 standard drink     Types: 1 Glasses of wine per week     Comment: occas    Drug use: Never    Sexual activity: Not Currently       Chief Complaint:   Chief Complaint   Patient presents with    Right Hip - Follow-up     Right hip follow up for total hip sx on 8/3/22. Pt states hip is fine but complication during sx causes her to not feel knee to foot - can only feel her toes. Can't drive and walk without dragging her foot across the floor. PT is going great.        History of present illness: 71-year-old female presents for follow-up after her right total hip arthroplasty.  Patient is 8 weeks out.  Ambulating without assistance.  Pain is controlled the hip.  Denies any complications with the hip, only complaint is the foot drop to the RLE. Although this is starting to improve, especially with therapy.      Review of Systems:    Constitution: Negative for chills, fever, and sweats.  Negative for unexplained weight loss.    HENT:  Negative for headaches and blurry vision.    Cardiovascular:Negative for chest pain or irregular heart beat. Negative for hypertension.    Respiratory:  Negative for cough and shortness of breath.    Gastrointestinal: Negative for abdominal pain, heartburn, melena, nausea, and vomitting.    Genitourinary:  Negative bladder incontinence and dysuria.    Musculoskeletal:  See HPI    Neurological: Negative for numbness.    Psychiatric/Behavioral: Negative for depression.  The patient is not nervous/anxious.      Endocrine: Negative for polyuria    Hematologic/Lymphatic: Negative for bleeding problem.  Does not bruise/bleed easily.    Skin: Negative for poor would healing and rash      Physical Examination:    Vital Signs:    Vitals:    09/29/22 1126   BP: 130/77   Pulse: 67       Body mass index is 31.45 kg/m².    Right hip:  Range of motion right without significant pain or discomfort.   Patient has a positive EHL.  Continues to have no active ankle extension.  Does have a 2/5 flicker of tib ant today    X-rays:  Three views right hip taken and reviewed.  Patient's implants appear well fixed.  No signs of loosening or subsidence noted     Assessment::  Status post right total hip arthroplasty    Plan:  Continue AFO for postoperative footdrop.  She is going to work on exercising and stretching her Achilles.  We will see her back in 2 months repeat evaluation of her right hip.  Remaining staples removed from incision site. Patient states understanding and agrees with plan of care.    The above findings, diagnostics, and treatment plan were discussed with Dr. Osmany Kwon who is in agreement with the plan of care.      This note was created using [employer] voice recognition software that occasionally misinterpreted phrases or words.    Consult note is delivered via Epic messaging service.

## 2022-10-17 DIAGNOSIS — M85.89 OSTEOPENIA OF MULTIPLE SITES: Primary | ICD-10-CM

## 2022-10-17 DIAGNOSIS — M81.0 OSTEOPOROSIS, UNSPECIFIED OSTEOPOROSIS TYPE, UNSPECIFIED PATHOLOGICAL FRACTURE PRESENCE: ICD-10-CM

## 2022-10-17 DIAGNOSIS — C50.411 MALIGNANT NEOPLASM OF UPPER-OUTER QUADRANT OF RIGHT FEMALE BREAST, UNSPECIFIED ESTROGEN RECEPTOR STATUS: ICD-10-CM

## 2022-10-18 ENCOUNTER — LAB VISIT (OUTPATIENT)
Dept: LAB | Facility: HOSPITAL | Age: 72
End: 2022-10-18
Attending: INTERNAL MEDICINE
Payer: MEDICARE

## 2022-10-18 DIAGNOSIS — M81.0 OSTEOPOROSIS, UNSPECIFIED OSTEOPOROSIS TYPE, UNSPECIFIED PATHOLOGICAL FRACTURE PRESENCE: ICD-10-CM

## 2022-10-18 DIAGNOSIS — M85.89 OSTEOPENIA OF MULTIPLE SITES: ICD-10-CM

## 2022-10-18 DIAGNOSIS — C50.411 MALIGNANT NEOPLASM OF UPPER-OUTER QUADRANT OF RIGHT FEMALE BREAST, UNSPECIFIED ESTROGEN RECEPTOR STATUS: ICD-10-CM

## 2022-10-18 LAB
ALBUMIN SERPL-MCNC: 3.5 GM/DL (ref 3.4–4.8)
ALBUMIN/GLOB SERPL: 1.2 RATIO (ref 1.1–2)
ALP SERPL-CCNC: 86 UNIT/L (ref 40–150)
ALT SERPL-CCNC: 8 UNIT/L (ref 0–55)
AST SERPL-CCNC: 14 UNIT/L (ref 5–34)
BASOPHILS # BLD AUTO: 0.04 X10(3)/MCL (ref 0–0.2)
BASOPHILS NFR BLD AUTO: 0.7 %
BILIRUBIN DIRECT+TOT PNL SERPL-MCNC: 0.3 MG/DL
BUN SERPL-MCNC: 16.9 MG/DL (ref 9.8–20.1)
CALCIUM SERPL-MCNC: 9.5 MG/DL (ref 8.4–10.2)
CHLORIDE SERPL-SCNC: 104 MMOL/L (ref 98–107)
CO2 SERPL-SCNC: 29 MMOL/L (ref 23–31)
CREAT SERPL-MCNC: 0.86 MG/DL (ref 0.55–1.02)
EOSINOPHIL # BLD AUTO: 0.08 X10(3)/MCL (ref 0–0.9)
EOSINOPHIL NFR BLD AUTO: 1.3 %
ERYTHROCYTE [DISTWIDTH] IN BLOOD BY AUTOMATED COUNT: 13.7 % (ref 11.5–17)
GFR SERPLBLD CREATININE-BSD FMLA CKD-EPI: >60 MLS/MIN/1.73/M2
GLOBULIN SER-MCNC: 3 GM/DL (ref 2.4–3.5)
GLUCOSE SERPL-MCNC: 100 MG/DL (ref 82–115)
HCT VFR BLD AUTO: 41.2 % (ref 37–47)
HGB BLD-MCNC: 12.4 GM/DL (ref 12–16)
IMM GRANULOCYTES # BLD AUTO: 0.01 X10(3)/MCL (ref 0–0.04)
IMM GRANULOCYTES NFR BLD AUTO: 0.2 %
LYMPHOCYTES # BLD AUTO: 2.2 X10(3)/MCL (ref 0.6–4.6)
LYMPHOCYTES NFR BLD AUTO: 36.3 %
MCH RBC QN AUTO: 27.9 PG (ref 27–31)
MCHC RBC AUTO-ENTMCNC: 30.1 MG/DL (ref 33–36)
MCV RBC AUTO: 92.6 FL (ref 80–94)
MONOCYTES # BLD AUTO: 0.57 X10(3)/MCL (ref 0.1–1.3)
MONOCYTES NFR BLD AUTO: 9.4 %
NEUTROPHILS # BLD AUTO: 3.2 X10(3)/MCL (ref 2.1–9.2)
NEUTROPHILS NFR BLD AUTO: 52.1 %
PLATELET # BLD AUTO: 255 X10(3)/MCL (ref 130–400)
PMV BLD AUTO: 8.8 FL (ref 7.4–10.4)
POTASSIUM SERPL-SCNC: 4.7 MMOL/L (ref 3.5–5.1)
PROT SERPL-MCNC: 6.5 GM/DL (ref 5.8–7.6)
RBC # BLD AUTO: 4.45 X10(6)/MCL (ref 4.2–5.4)
SODIUM SERPL-SCNC: 140 MMOL/L (ref 136–145)
WBC # SPEC AUTO: 6.1 X10(3)/MCL (ref 4.5–11.5)

## 2022-10-18 PROCEDURE — 36415 COLL VENOUS BLD VENIPUNCTURE: CPT

## 2022-10-18 PROCEDURE — 80053 COMPREHEN METABOLIC PANEL: CPT

## 2022-10-18 PROCEDURE — 85025 COMPLETE CBC W/AUTO DIFF WBC: CPT

## 2022-10-19 PROBLEM — K57.92 DIVERTICULITIS: Status: ACTIVE | Noted: 2019-12-09

## 2022-10-19 PROBLEM — I48.20 CHRONIC ATRIAL FIBRILLATION: Status: ACTIVE | Noted: 2019-05-13

## 2022-10-19 PROBLEM — M87.059 ASEPTIC NECROSIS OF BONE OF HIP: Status: ACTIVE | Noted: 2019-11-13

## 2022-10-19 PROBLEM — M85.88 OSTEOPENIA OF SPINE: Status: ACTIVE | Noted: 2022-07-20

## 2022-10-19 PROBLEM — C50.919 MALIGNANT NEOPLASM OF BREAST: Status: ACTIVE | Noted: 2022-10-19

## 2022-10-19 PROBLEM — E16.2 HYPOGLYCEMIA: Status: ACTIVE | Noted: 2019-11-13

## 2022-10-19 PROBLEM — M16.9 OSTEOARTHRITIS OF HIP: Status: ACTIVE | Noted: 2019-11-13

## 2022-10-19 PROBLEM — M54.16 LUMBAR RADICULOPATHY: Status: ACTIVE | Noted: 2019-11-13

## 2022-10-19 PROBLEM — M79.7 FIBROMYALGIA: Status: ACTIVE | Noted: 2019-05-13

## 2022-10-19 NOTE — PROGRESS NOTES
Subjective:       Patient ID: Candace Macias is a 71 y.o. female.    Surgeons: Dr. Rambo Cassidy/Dr. Delonte Stevens  PCP: Dr. Jamari Larsen    Right Breast Cancer stage IIA (T2N0M0) diagnosed 18  Biopsy/pathology: Right breast 10:00 core biopsy done 18--invasive ductal carcinoma, high grade, measuring at least 0.7cm, DCIS minor component solid pattern, nuclear grade 3/3 without necrosis, ER 98%, WA 38%, Her 2 Equivocal 2+ by IHC, non-amplified by FISH, Ki67 10.5%.  Surgery/pathology: Bilateral mastectomies with reconstruction done 18--invasive ductal carcinoma measures 2.3cm, grade 3, margins clear, associated DCIS high grade, 2 lymph nodes negative. Oncotype DX testing done showed tumor high risk with score of 33, risk of 9-year distant recurrence 21% with >15% benefit from chemotherapy.  Imagin. Screening MMG/US done 18 [facility]--1cm lobulated mass right breast UOQ on MMG, two hypoechoic masses right breast UOQ, more superficial mass measures 0.7cm, second mass more posterior and measures 0.7cm, BIRADS 0 incomplete.  2. Right breast and axillary US 18 [facility]--2 masses along the 10:00 position right breast, one measures 0.9cm with microlobulated and angular margins, another more posterior measures 1.2cm, two masses are 0.7cm apart with longest length 2.1cm, both are highly suspicious for malignancy suggesting multifocal invasive carcinoma.    DEXA:  02/23/15 Dr. [facility]ed[facility]'s office AP spine T= -1.33, Femoral neck -1.8, total left hip -1.09 c/w osteopenia.  19 [facility] AP spine T= -2.5, Femur neck left -1.3, right -2.1, total hip left -1.5, right -1.8 c/w osteoporosis in spine.  21 [facility] AP spine -1.5, Right femoral neck -1.4, Total right -1.1, Left radius -1.0. Consistent with improvement.     Treatment history:  Bilateral mastectomies with reconstruction 18.  Reconstruction revision 5/15/19.  Treatment recommended with Taxotere/Cytoxan X 4 cycles but decided  against treatment    Current treatment plan:  1. Femara started 2/12/19--plan for 10 years  2. Prolia started 8/13/19 for osteoporosis--next dose due tomorrow.     Chief Complaint: Other Misc (Pt reports no new concerns today.)    HPI  Patient is here for follow-up of breast cancer. She is feeling great. She continues on Femara and is doing well. She recently had right hip surgery that was further complicated with right lower extremity neuropathy. She continues with PT and is slowly improving. Her right lower extremity is still swollen and has limited ROM. Otherwise no other problems reported.     Past Medical History:   Diagnosis Date    A-fib     Asthma     Breast cancer     Fibromyalgia     Hypertension     Osteoarthritis       Review of patient's allergies indicates:   Allergen Reactions    Loratadine-pseudoephedrine Rash and Shortness Of Breath     Other reaction(s): hives, pruritis        Current Outpatient Medications:     amitriptyline (ELAVIL) 50 MG tablet, Take 50 mg by mouth nightly., Disp: , Rfl:     ascorbic acid, vitamin C, (VITAMIN C) 1000 MG tablet, Take 1,000 mg by mouth once daily., Disp: , Rfl:     bisacodyL (DULCOLAX) 5 mg EC tablet, Take 10 mg by mouth daily as needed., Disp: , Rfl:     gabapentin (NEURONTIN) 300 MG capsule, Take 1 capsule (300 mg total) by mouth every evening., Disp: 30 capsule, Rfl: 11    letrozole (FEMARA) 2.5 mg Tab, Take 2.5 mg by mouth nightly., Disp: , Rfl:     metoprolol succinate (TOPROL-XL) 25 MG 24 hr tablet, Take 25 mg by mouth every evening., Disp: , Rfl:     pregabalin (LYRICA) 50 MG capsule, Take 50 mg by mouth every evening., Disp: , Rfl:     rOPINIRole (REQUIP) 1 MG tablet, Take 2 mg by mouth nightly., Disp: , Rfl:     tiZANidine (ZANAFLEX) 4 MG tablet, Take 4 mg by mouth nightly as needed., Disp: , Rfl:     traMADoL (ULTRAM) 50 mg tablet, Take 1 tablet (50 mg total) by mouth every 4 (four) hours as needed (as needed for pain score 1-5)., Disp: 28 tablet, Rfl:  0    vitamin D (VITAMIN D3) 1000 units Tab, Take 1,000 Units by mouth once daily., Disp: , Rfl:     vitamin E 400 UNIT capsule, Take 400 Units by mouth., Disp: , Rfl:     XARELTO 20 mg Tab, Take 20 mg by mouth once daily. Last dose to be on Sunday July 31st., Disp: , Rfl:   Review of Systems   Constitutional:  Negative for activity change, fever and unexpected weight change.   Eyes:  Negative for visual disturbance.   Respiratory:  Negative for cough and shortness of breath.    Cardiovascular:  Negative for chest pain.   Gastrointestinal:  Negative for abdominal pain, blood in stool, constipation, diarrhea, nausea and vomiting.   Genitourinary:  Negative for difficulty urinating.   Musculoskeletal:  Negative for back pain.        S/p right hip surgery   Integumentary:  Negative for rash.   Neurological:  Negative for dizziness, weakness and headaches.        Numbness and limited ROM to right lower extremity   Psychiatric/Behavioral:  Negative for behavioral problems and suicidal ideas.           Vitals:    10/25/22 1251   BP: 134/80   Pulse: 75   Resp: 14   Temp: 98.1 °F (36.7 °C)      Wt Readings from Last 3 Encounters:   10/25/22 1251 96.1 kg (211 lb 14.4 oz)   09/29/22 1126 96.6 kg (213 lb)   08/19/22 1006 96.6 kg (213 lb)      Physical Exam  Vitals reviewed.   Constitutional:       Appearance: Normal appearance. She is overweight.   HENT:      Head: Normocephalic.   Eyes:      General: Lids are normal. Vision grossly intact.      Extraocular Movements: Extraocular movements intact.      Conjunctiva/sclera: Conjunctivae normal.   Cardiovascular:      Rate and Rhythm: Normal rate and regular rhythm.      Pulses: Normal pulses.      Heart sounds: Normal heart sounds, S1 normal and S2 normal.   Pulmonary:      Effort: Pulmonary effort is normal.      Breath sounds: Normal breath sounds.   Chest:      Comments: S/p bilateral mastectomies with reconstruction, no masses, no palpable adenopathy.  Abdominal:       General: Abdomen is protuberant. Bowel sounds are normal.      Palpations: Abdomen is soft.   Musculoskeletal:      Cervical back: Normal range of motion and neck supple.      Right lower le+ Edema present.      Comments: Right foot drop   Feet:      Right foot:      Skin integrity: Skin integrity normal.   Skin:     General: Skin is warm and moist.      Capillary Refill: Capillary refill takes less than 2 seconds.   Neurological:      General: No focal deficit present.      Mental Status: She is alert and oriented to person, place, and time.   Psychiatric:         Attention and Perception: Attention normal.         Mood and Affect: Mood normal.         Speech: Speech normal.         Behavior: Behavior normal. Behavior is cooperative.         Cognition and Memory: Cognition normal.         Judgment: Judgment normal.     ECOG SCORE    1 - Restricted in strenuous activity-ambulatory and able to carry out work of a light nature       No visits with results within 1 Week(s) from this visit.   Latest known visit with results is:   Lab Visit on 10/18/2022   Component Date Value    Sodium Level 10/18/2022 140     Potassium Level 10/18/2022 4.7     Chloride 10/18/2022 104     Carbon Dioxide 10/18/2022 29     Glucose Level 10/18/2022 100     Blood Urea Nitrogen 10/18/2022 16.9     Creatinine 10/18/2022 0.86     Calcium Level Total 10/18/2022 9.5     Protein Total 10/18/2022 6.5     Albumin Level 10/18/2022 3.5     Globulin 10/18/2022 3.0     Albumin/Globulin Ratio 10/18/2022 1.2     Bilirubin Total 10/18/2022 0.3     Alkaline Phosphatase 10/18/2022 86     Alanine Aminotransferase 10/18/2022 8     Aspartate Aminotransfera* 10/18/2022 14     eGFR 10/18/2022 >60     WBC 10/18/2022 6.1     RBC 10/18/2022 4.45     Hgb 10/18/2022 12.4     Hct 10/18/2022 41.2     MCV 10/18/2022 92.6     MCH 10/18/2022 27.9     MCHC 10/18/2022 30.1 (L)     RDW 10/18/2022 13.7     Platelet 10/18/2022 255     MPV 10/18/2022 8.8     Neut %  10/18/2022 52.1     Lymph % 10/18/2022 36.3     Mono % 10/18/2022 9.4     Eos % 10/18/2022 1.3     Basophil % 10/18/2022 0.7     Lymph # 10/18/2022 2.20     Neut # 10/18/2022 3.2     Mono # 10/18/2022 0.57     Eos # 10/18/2022 0.08     Baso # 10/18/2022 0.04     IG# 10/18/2022 0.01     IG% 10/18/2022 0.2           Assessment:       Problem List Items Addressed This Visit          Oncology    Malignant neoplasm of breast - Primary       Orthopedic    Osteopenia of spine          Plan:       Patient with stage IIA right breast cancer s/p bilateral mastectomies with reconstruction done 11/13/18 in [place] with Dr. Rambo Cassidy. Tumor measured 2.3cm, ER and DC strongly positive and Her2 negative with negative sentinel lymph nodes.  Per NCCN guidelines, Oncotype DX testing is recommended.  Patient was told by her surgeon that she would not need chemotherapy and I am not sure why this happened and she is upset. I explained decision making in detail, went over NCCN guidelines recommending oncotype DX testing.  Oncotype DX score returned 33, high risk. Explained that risk of recurrence with AI alone is 21% and that chemotherapy will add benefit of >15%.  I recommended treatment with Taxotere/Cytoxan X 4 cycles in addition to AI.  Patient ultimately decided against chemotherapy.    Currently patient is doing well without any signs or symptoms to suggest disease recurrence.  Femara started 2/12/19 and she is tolerating well.  DEXA from 8/2/19 showed osteoporosis in spine and osteopenia in hips. Prolia started every 6 months on 8/13/19. Repeat DEXA on 9/13/21 showed significant improvement. Plan to repeat DEXA in 9/2023.  Continue Prolia. Next dose due tomorrow.   Continue Femara.  Continue calcium and vitamin D.   Recent labs all good.   Continue with surveillance visits every 6 months with labs.  No MMG needed since bilateral mastectomies.  All questions answered at this time.        Karla Cifuentes, FELA

## 2022-10-25 ENCOUNTER — INFUSION (OUTPATIENT)
Dept: INFUSION THERAPY | Facility: HOSPITAL | Age: 72
End: 2022-10-25
Attending: INTERNAL MEDICINE
Payer: MEDICARE

## 2022-10-25 ENCOUNTER — OFFICE VISIT (OUTPATIENT)
Dept: HEMATOLOGY/ONCOLOGY | Facility: CLINIC | Age: 72
End: 2022-10-25
Payer: MEDICARE

## 2022-10-25 VITALS
RESPIRATION RATE: 14 BRPM | DIASTOLIC BLOOD PRESSURE: 80 MMHG | OXYGEN SATURATION: 95 % | BODY MASS INDEX: 29.66 KG/M2 | HEART RATE: 75 BPM | WEIGHT: 211.88 LBS | SYSTOLIC BLOOD PRESSURE: 134 MMHG | HEIGHT: 71 IN | TEMPERATURE: 98 F

## 2022-10-25 DIAGNOSIS — M85.88 OSTEOPENIA OF SPINE: Primary | ICD-10-CM

## 2022-10-25 DIAGNOSIS — Z17.0 MALIGNANT NEOPLASM OF UPPER-OUTER QUADRANT OF RIGHT BREAST IN FEMALE, ESTROGEN RECEPTOR POSITIVE: Primary | ICD-10-CM

## 2022-10-25 DIAGNOSIS — M85.88 OSTEOPENIA OF SPINE: ICD-10-CM

## 2022-10-25 DIAGNOSIS — C50.411 MALIGNANT NEOPLASM OF UPPER-OUTER QUADRANT OF RIGHT BREAST IN FEMALE, ESTROGEN RECEPTOR POSITIVE: Primary | ICD-10-CM

## 2022-10-25 PROCEDURE — 99214 OFFICE O/P EST MOD 30 MIN: CPT | Mod: PBBFAC,25 | Performed by: NURSE PRACTITIONER

## 2022-10-25 PROCEDURE — 99213 PR OFFICE/OUTPT VISIT, EST, LEVL III, 20-29 MIN: ICD-10-PCS | Mod: S$PBB,,, | Performed by: NURSE PRACTITIONER

## 2022-10-25 PROCEDURE — 63600175 PHARM REV CODE 636 W HCPCS: Mod: JG | Performed by: NURSE PRACTITIONER

## 2022-10-25 PROCEDURE — 99999 PR PBB SHADOW E&M-EST. PATIENT-LVL IV: CPT | Mod: PBBFAC,,, | Performed by: NURSE PRACTITIONER

## 2022-10-25 PROCEDURE — 96372 THER/PROPH/DIAG INJ SC/IM: CPT

## 2022-10-25 PROCEDURE — 99213 OFFICE O/P EST LOW 20 MIN: CPT | Mod: S$PBB,,, | Performed by: NURSE PRACTITIONER

## 2022-10-25 PROCEDURE — 99999 PR PBB SHADOW E&M-EST. PATIENT-LVL IV: ICD-10-PCS | Mod: PBBFAC,,, | Performed by: NURSE PRACTITIONER

## 2022-10-25 RX ADMIN — DENOSUMAB 60 MG: 60 INJECTION SUBCUTANEOUS at 01:10

## 2022-10-27 ENCOUNTER — TELEPHONE (OUTPATIENT)
Dept: HEMATOLOGY/ONCOLOGY | Facility: CLINIC | Age: 72
End: 2022-10-27
Payer: MEDICARE

## 2023-04-11 ENCOUNTER — TELEPHONE (OUTPATIENT)
Dept: HEMATOLOGY/ONCOLOGY | Facility: CLINIC | Age: 73
End: 2023-04-11
Payer: MEDICARE

## 2023-05-02 DIAGNOSIS — C50.411 MALIGNANT NEOPLASM OF UPPER-OUTER QUADRANT OF RIGHT BREAST IN FEMALE, ESTROGEN RECEPTOR POSITIVE: Primary | ICD-10-CM

## 2023-05-02 DIAGNOSIS — Z17.0 MALIGNANT NEOPLASM OF UPPER-OUTER QUADRANT OF RIGHT BREAST IN FEMALE, ESTROGEN RECEPTOR POSITIVE: Primary | ICD-10-CM

## 2023-05-02 RX ORDER — LETROZOLE 2.5 MG/1
2.5 TABLET, FILM COATED ORAL DAILY
Qty: 30 TABLET | Refills: 2 | Status: SHIPPED | OUTPATIENT
Start: 2023-05-02 | End: 2023-06-12 | Stop reason: SDUPTHER

## 2023-05-09 ENCOUNTER — INFUSION (OUTPATIENT)
Dept: INFUSION THERAPY | Facility: HOSPITAL | Age: 73
End: 2023-05-09
Attending: INTERNAL MEDICINE
Payer: MEDICARE

## 2023-05-09 VITALS
TEMPERATURE: 98 F | SYSTOLIC BLOOD PRESSURE: 145 MMHG | DIASTOLIC BLOOD PRESSURE: 80 MMHG | RESPIRATION RATE: 18 BRPM | HEART RATE: 79 BPM

## 2023-05-09 DIAGNOSIS — M85.88 OSTEOPENIA OF SPINE: Primary | ICD-10-CM

## 2023-05-09 PROCEDURE — 96372 THER/PROPH/DIAG INJ SC/IM: CPT

## 2023-05-09 PROCEDURE — 63600175 PHARM REV CODE 636 W HCPCS: Mod: JZ,JG | Performed by: NURSE PRACTITIONER

## 2023-05-09 RX ADMIN — DENOSUMAB 60 MG: 60 INJECTION SUBCUTANEOUS at 10:05

## 2023-06-02 NOTE — PROGRESS NOTES
Subjective:       Patient ID: Candace Macias is a 72 y.o. female.    Surgeons: Dr. Rambo Cassidy/Dr. Delonte Stevens  PCP: Dr. Jamari Larsen    Right Breast Cancer stage IIA (T2N0M0) diagnosed 18  Biopsy/pathology: Right breast 10:00 core biopsy done 18--invasive ductal carcinoma, high grade, measuring at least 0.7cm, DCIS minor component solid pattern, nuclear grade 3/3 without necrosis, ER 98%, [place] 38%, Her 2 Equivocal 2+ by IHC, non-amplified by FISH, Ki67 10.5%.  Surgery/pathology: Bilateral mastectomies with reconstruction done 18--invasive ductal carcinoma measures 2.3cm, grade 3, margins clear, associated DCIS high grade, 2 lymph nodes negative. Oncotype DX testing done showed tumor high risk with score of 33, risk of 9-year distant recurrence 21% with >15% benefit from chemotherapy.  Imagin. Screening MMG/US done 18 [facility]--1cm lobulated mass right breast UOQ on MMG, two hypoechoic masses right breast UOQ, more superficial mass measures 0.7cm, second mass more posterior and measures 0.7cm, BIRADS 0 incomplete.  2. Right breast and axillary US 18 [facility]--2 masses along the 10:00 position right breast, one measures 0.9cm with microlobulated and angular margins, another more posterior measures 1.2cm, two masses are 0.7cm apart with longest length 2.1cm, both are highly suspicious for malignancy suggesting multifocal invasive carcinoma.    DEXA:  02/23/15 Dr. Sanders's office AP spine T= -1.33, Femoral neck -1.8, total left hip -1.09 c/w osteopenia.  19 [facility] AP spine T= -2.5, Femur neck left -1.3, right -2.1, total hip left -1.5, right -1.8 c/w osteoporosis in spine.  21 [facility] AP spine -1.5, Right femoral neck -1.4, Total right -1.1, Left radius -1.0. Consistent with improvement.     Treatment history:  Bilateral mastectomies with reconstruction 18.  Reconstruction revision 5/15/19.  Treatment recommended with Taxotere/Cytoxan X 4 cycles but decided  against treatment    Current treatment plan:  1. Femara started 2/12/19--plan for 10 years  2. Prolia started 8/13/19 for osteoporosis--next dose due November 2023.     Chief Complaint: Other Misc (Pt reports no new concerns today.)    HPI  Patient is here for follow-up of breast cancer. She is feeling great. She continues on Femara and is doing well. She had right hip surgery that was further complicated with right lower extremity neuropathy. She continues with PT. She is planning on seeing a specialist in [place]. Her right lower extremity is still swollen and has limited ROM. Otherwise no other problems reported.     Past Medical History:   Diagnosis Date    A-fib     Asthma     Breast cancer     Fibromyalgia     Hypertension     Osteoarthritis       Review of patient's allergies indicates:   Allergen Reactions    Loratadine-pseudoephedrine Rash and Shortness Of Breath     Other reaction(s): hives, pruritis        Current Outpatient Medications:     amitriptyline (ELAVIL) 50 MG tablet, Take 50 mg by mouth nightly., Disp: , Rfl:     ascorbic acid, vitamin C, (VITAMIN C) 1000 MG tablet, Take 1,000 mg by mouth once daily., Disp: , Rfl:     bisacodyL (DULCOLAX) 5 mg EC tablet, Take 10 mg by mouth daily as needed., Disp: , Rfl:     gabapentin (NEURONTIN) 300 MG capsule, Take 1 capsule (300 mg total) by mouth every evening., Disp: 30 capsule, Rfl: 11    letrozole (FEMARA) 2.5 mg Tab, Take 1 tablet (2.5 mg total) by mouth once daily., Disp: 30 tablet, Rfl: 2    metoprolol succinate (TOPROL-XL) 25 MG 24 hr tablet, Take 25 mg by mouth every evening., Disp: , Rfl:     pregabalin (LYRICA) 50 MG capsule, Take 50 mg by mouth every evening., Disp: , Rfl:     rOPINIRole (REQUIP) 1 MG tablet, Take 2 mg by mouth nightly., Disp: , Rfl:     tiZANidine (ZANAFLEX) 4 MG tablet, Take 4 mg by mouth nightly as needed., Disp: , Rfl:     traMADoL (ULTRAM) 50 mg tablet, Take 1 tablet (50 mg total) by mouth every 4 (four) hours as needed  (as needed for pain score 1-5)., Disp: 28 tablet, Rfl: 0    vitamin D (VITAMIN D3) 1000 units Tab, Take 1,000 Units by mouth once daily., Disp: , Rfl:     vitamin E 400 UNIT capsule, Take 400 Units by mouth., Disp: , Rfl:     XARELTO 20 mg Tab, Take 20 mg by mouth once daily. Last dose to be on Sunday July 31st., Disp: , Rfl:     letrozole (FEMARA) 2.5 mg Tab, Take 2.5 mg by mouth nightly., Disp: , Rfl:   Review of Systems   Constitutional:  Negative for activity change, fever and unexpected weight change.   Eyes:  Negative for visual disturbance.   Respiratory:  Negative for cough and shortness of breath.    Cardiovascular:  Negative for chest pain.   Gastrointestinal:  Negative for abdominal pain, blood in stool, constipation, diarrhea, nausea and vomiting.   Genitourinary:  Negative for difficulty urinating.   Musculoskeletal:  Negative for back pain.        S/p right hip surgery   Integumentary:  Negative for rash.   Neurological:  Negative for dizziness, weakness and headaches.        Numbness and limited ROM to right lower extremity   Psychiatric/Behavioral:  Negative for behavioral problems and suicidal ideas.        Vitals:    06/12/23 1003   BP: (!) 154/85   Pulse: 74   Resp: 14   Temp: 98.3 °F (36.8 °C)        Wt Readings from Last 3 Encounters:   06/12/23 1003 99.2 kg (218 lb 11.2 oz)   06/09/23 0825 95.3 kg (210 lb)   06/09/23 0824 95.3 kg (210 lb)   10/25/22 1251 96.1 kg (211 lb 14.4 oz)      Physical Exam  Vitals reviewed.   Constitutional:       Appearance: Normal appearance. She is overweight.   HENT:      Head: Normocephalic.   Eyes:      General: Lids are normal. Vision grossly intact.      Extraocular Movements: Extraocular movements intact.      Conjunctiva/sclera: Conjunctivae normal.   Cardiovascular:      Rate and Rhythm: Normal rate and regular rhythm.      Pulses: Normal pulses.      Heart sounds: Normal heart sounds, S1 normal and S2 normal.   Pulmonary:      Effort: Pulmonary effort is  normal.      Breath sounds: Normal breath sounds.   Chest:      Comments: S/p bilateral nipple-sparing mastectomies with reconstruction, no masses, no palpable adenopathy.  Abdominal:      General: Abdomen is protuberant. Bowel sounds are normal.      Palpations: Abdomen is soft.   Musculoskeletal:      Cervical back: Normal range of motion and neck supple.      Right lower le+ Edema present.      Comments: Right foot drop   Skin:     General: Skin is warm and moist.      Capillary Refill: Capillary refill takes less than 2 seconds.   Neurological:      General: No focal deficit present.      Mental Status: She is alert and oriented to person, place, and time.   Psychiatric:         Attention and Perception: Attention normal.         Mood and Affect: Mood normal.         Speech: Speech normal.         Behavior: Behavior normal. Behavior is cooperative.         Cognition and Memory: Cognition normal.         Judgment: Judgment normal.     ECOG SCORE    1 - Restricted in strenuous activity-ambulatory and able to carry out work of a light nature       Lab Visit on 2023   Component Date Value    Sodium Level 2023 141     Potassium Level 2023 4.7     Chloride 2023 107     Carbon Dioxide 2023 27     Glucose Level 2023 100     Blood Urea Nitrogen 2023 17.6     Creatinine 2023 0.79     Calcium Level Total 2023 9.2     Protein Total 2023 6.4     Albumin Level 2023 3.6     Globulin 2023 2.8     Albumin/Globulin Ratio 2023 1.3     Bilirubin Total 2023 0.3     Alkaline Phosphatase 2023 67     Alanine Aminotransferase 2023 7     Aspartate Aminotransfera* 2023 15     eGFR 2023 >60     WBC 2023 4.86     RBC 2023 4.42     Hgb 2023 13.0     Hct 2023 41.6     MCV 2023 94.1 (H)     MCH 2023 29.4     MCHC 2023 31.3 (L)     RDW 2023 13.0     Platelet 2023 228     MPV  06/09/2023 8.9     Neut % 06/09/2023 38.1     Lymph % 06/09/2023 50.2     Mono % 06/09/2023 8.4     Eos % 06/09/2023 2.5     Basophil % 06/09/2023 0.6     Lymph # 06/09/2023 2.44     Neut # 06/09/2023 1.85 (L)     Mono # 06/09/2023 0.41     Eos # 06/09/2023 0.12     Baso # 06/09/2023 0.03     IG# 06/09/2023 0.01     IG% 06/09/2023 0.2           Assessment:       Problem List Items Addressed This Visit          Oncology    Malignant neoplasm of breast - Primary    Relevant Orders    CBC Auto Differential    Comprehensive Metabolic Panel     Other Visit Diagnoses       Osteopenia of multiple sites        Relevant Orders    DXA Bone Density Axial Skeleton 1 or more sites    CBC Auto Differential    Comprehensive Metabolic Panel    Osteoporosis, unspecified osteoporosis type, unspecified pathological fracture presence        Relevant Orders    DXA Bone Density Axial Skeleton 1 or more sites    CBC Auto Differential    Comprehensive Metabolic Panel               Plan:       Patient with stage IIA right breast cancer s/p bilateral mastectomies with reconstruction done 11/13/18 in [place] with Dr. Rambo Cassidy. Tumor measured 2.3cm, ER and ND strongly positive and Her2 negative with negative sentinel lymph nodes.  Per NCCN guidelines, Oncotype DX testing is recommended.  Patient was told by her surgeon that she would not need chemotherapy and I am not sure why this happened and she is upset. I explained decision making in detail, went over NCCN guidelines recommending oncotype DX testing.  Oncotype DX score returned 33, high risk. Explained that risk of recurrence with AI alone is 21% and that chemotherapy will add benefit of >15%.  I recommended treatment with Taxotere/Cytoxan X 4 cycles in addition to AI.  Patient ultimately decided against chemotherapy.    Currently patient is doing well without any signs or symptoms to suggest disease recurrence.  Femara started 2/12/19 and she is tolerating well.  DEXA from  8/2/19 showed osteoporosis in spine and osteopenia in hips. Prolia started every 6 months on 8/13/19. Repeat DEXA on 9/13/21 showed significant improvement. Plan to repeat DEXA in 9/2023.  Continue Prolia. Next dose due November 2023.  Continue Femara.  Continue calcium and vitamin D.   Recent labs all good.   Continue with surveillance visits every 6 months with labs. Will change to annual appointments after since she will be >5 years from diagnosis.  Since she has nipple-sparing mastectomies, she can have annual screening MMGs per NCCN guidelines. Will send orders to [facility].   All questions answered at this time.        Karla Cifuentes, EDGARD

## 2023-06-09 ENCOUNTER — HOSPITAL ENCOUNTER (OUTPATIENT)
Dept: RADIOLOGY | Facility: CLINIC | Age: 73
Discharge: HOME | End: 2023-06-09
Attending: ORTHOPAEDIC SURGERY
Payer: MEDICARE

## 2023-06-09 ENCOUNTER — OFFICE VISIT (OUTPATIENT)
Dept: ORTHOPEDICS | Facility: CLINIC | Age: 73
End: 2023-06-09
Payer: MEDICARE

## 2023-06-09 ENCOUNTER — LAB VISIT (OUTPATIENT)
Dept: LAB | Facility: HOSPITAL | Age: 73
End: 2023-06-09
Attending: INTERNAL MEDICINE
Payer: MEDICARE

## 2023-06-09 VITALS
BODY MASS INDEX: 31.1 KG/M2 | DIASTOLIC BLOOD PRESSURE: 74 MMHG | WEIGHT: 210 LBS | HEART RATE: 72 BPM | SYSTOLIC BLOOD PRESSURE: 132 MMHG | HEIGHT: 69 IN

## 2023-06-09 DIAGNOSIS — Z17.0 MALIGNANT NEOPLASM OF UPPER-OUTER QUADRANT OF RIGHT BREAST IN FEMALE, ESTROGEN RECEPTOR POSITIVE: ICD-10-CM

## 2023-06-09 DIAGNOSIS — Z47.1 AFTERCARE FOLLOWING RIGHT HIP JOINT REPLACEMENT SURGERY: Primary | ICD-10-CM

## 2023-06-09 DIAGNOSIS — C50.411 MALIGNANT NEOPLASM OF UPPER-OUTER QUADRANT OF RIGHT BREAST IN FEMALE, ESTROGEN RECEPTOR POSITIVE: ICD-10-CM

## 2023-06-09 DIAGNOSIS — M85.88 OSTEOPENIA OF SPINE: ICD-10-CM

## 2023-06-09 DIAGNOSIS — Z96.641 AFTERCARE FOLLOWING RIGHT HIP JOINT REPLACEMENT SURGERY: ICD-10-CM

## 2023-06-09 DIAGNOSIS — Z47.1 AFTERCARE FOLLOWING RIGHT HIP JOINT REPLACEMENT SURGERY: ICD-10-CM

## 2023-06-09 DIAGNOSIS — M21.371 FOOT DROP, RIGHT: ICD-10-CM

## 2023-06-09 DIAGNOSIS — Z96.641 AFTERCARE FOLLOWING RIGHT HIP JOINT REPLACEMENT SURGERY: Primary | ICD-10-CM

## 2023-06-09 LAB
ALBUMIN SERPL-MCNC: 3.6 G/DL (ref 3.4–4.8)
ALBUMIN/GLOB SERPL: 1.3 RATIO (ref 1.1–2)
ALP SERPL-CCNC: 67 UNIT/L (ref 40–150)
ALT SERPL-CCNC: 7 UNIT/L (ref 0–55)
AST SERPL-CCNC: 15 UNIT/L (ref 5–34)
BASOPHILS # BLD AUTO: 0.03 X10(3)/MCL
BASOPHILS NFR BLD AUTO: 0.6 %
BILIRUBIN DIRECT+TOT PNL SERPL-MCNC: 0.3 MG/DL
BUN SERPL-MCNC: 17.6 MG/DL (ref 9.8–20.1)
CALCIUM SERPL-MCNC: 9.2 MG/DL (ref 8.4–10.2)
CHLORIDE SERPL-SCNC: 107 MMOL/L (ref 98–107)
CO2 SERPL-SCNC: 27 MMOL/L (ref 23–31)
CREAT SERPL-MCNC: 0.79 MG/DL (ref 0.55–1.02)
EOSINOPHIL # BLD AUTO: 0.12 X10(3)/MCL (ref 0–0.9)
EOSINOPHIL NFR BLD AUTO: 2.5 %
ERYTHROCYTE [DISTWIDTH] IN BLOOD BY AUTOMATED COUNT: 13 % (ref 11.5–17)
GFR SERPLBLD CREATININE-BSD FMLA CKD-EPI: >60 MLS/MIN/1.73/M2
GLOBULIN SER-MCNC: 2.8 GM/DL (ref 2.4–3.5)
GLUCOSE SERPL-MCNC: 100 MG/DL (ref 82–115)
HCT VFR BLD AUTO: 41.6 % (ref 37–47)
HGB BLD-MCNC: 13 G/DL (ref 12–16)
IMM GRANULOCYTES # BLD AUTO: 0.01 X10(3)/MCL (ref 0–0.04)
IMM GRANULOCYTES NFR BLD AUTO: 0.2 %
LYMPHOCYTES # BLD AUTO: 2.44 X10(3)/MCL (ref 0.6–4.6)
LYMPHOCYTES NFR BLD AUTO: 50.2 %
MCH RBC QN AUTO: 29.4 PG (ref 27–31)
MCHC RBC AUTO-ENTMCNC: 31.3 G/DL (ref 33–36)
MCV RBC AUTO: 94.1 FL (ref 80–94)
MONOCYTES # BLD AUTO: 0.41 X10(3)/MCL (ref 0.1–1.3)
MONOCYTES NFR BLD AUTO: 8.4 %
NEUTROPHILS # BLD AUTO: 1.85 X10(3)/MCL (ref 2.1–9.2)
NEUTROPHILS NFR BLD AUTO: 38.1 %
PLATELET # BLD AUTO: 228 X10(3)/MCL (ref 130–400)
PMV BLD AUTO: 8.9 FL (ref 7.4–10.4)
POTASSIUM SERPL-SCNC: 4.7 MMOL/L (ref 3.5–5.1)
PROT SERPL-MCNC: 6.4 GM/DL (ref 5.8–7.6)
RBC # BLD AUTO: 4.42 X10(6)/MCL (ref 4.2–5.4)
SODIUM SERPL-SCNC: 141 MMOL/L (ref 136–145)
WBC # SPEC AUTO: 4.86 X10(3)/MCL (ref 4.5–11.5)

## 2023-06-09 PROCEDURE — 73502 XR HIP WITH PELVIS WHEN PERFORMED, 2 OR 3  VIEWS RIGHT: ICD-10-PCS | Mod: RT,,, | Performed by: ORTHOPAEDIC SURGERY

## 2023-06-09 PROCEDURE — 99213 PR OFFICE/OUTPT VISIT, EST, LEVL III, 20-29 MIN: ICD-10-PCS | Mod: ,,, | Performed by: ORTHOPAEDIC SURGERY

## 2023-06-09 PROCEDURE — 36415 COLL VENOUS BLD VENIPUNCTURE: CPT

## 2023-06-09 PROCEDURE — 73502 X-RAY EXAM HIP UNI 2-3 VIEWS: CPT | Mod: RT,,, | Performed by: ORTHOPAEDIC SURGERY

## 2023-06-09 PROCEDURE — 85025 COMPLETE CBC W/AUTO DIFF WBC: CPT

## 2023-06-09 PROCEDURE — 80053 COMPREHEN METABOLIC PANEL: CPT

## 2023-06-09 PROCEDURE — 99213 OFFICE O/P EST LOW 20 MIN: CPT | Mod: ,,, | Performed by: ORTHOPAEDIC SURGERY

## 2023-06-09 NOTE — PROGRESS NOTES
Chief Complaint:   Chief Complaint   Patient presents with    Hip Pain     Pt states she has been experiencing swelling, shooting pain in her Rt thigh  and numbness . Pt states after she had her Rt hip replaced, she believes she had nerve damage. Pt states she is walking crooked and the hip pain started to affect her lower back. Pain unable patient to drive, to move her foot upward, and its interfering her daily tasks.        History of present illness:  70-year-old female presents for follow-up of hip arthroplasty.  Patient's hip is doing well.  She continues to have issues footdrop right side.  She is not had any significant return of function.  Complains of some numbness lateral aspect of leg as well shooting pain on occasion    Past Medical History:   Diagnosis Date    A-fib     Asthma     Breast cancer     Fibromyalgia     Hypertension     Osteoarthritis        Past Surgical History:   Procedure Laterality Date    APPENDECTOMY      MASTECTOMY Bilateral     ROBOTIC ARTHROPLASTY, HIP Right 8/3/2022    Procedure: ROBOTIC ARTHROPLASTY,HIP;  Surgeon: Osmany Kwon MD;  Location: [facility];  Service: Orthopedics;  Laterality: Right;    TOTAL HIP ARTHROPLASTY Left     PATRICK GIRON       Current Outpatient Medications   Medication Sig    amitriptyline (ELAVIL) 50 MG tablet Take 50 mg by mouth nightly.    ascorbic acid, vitamin C, (VITAMIN C) 1000 MG tablet Take 1,000 mg by mouth once daily.    bisacodyL (DULCOLAX) 5 mg EC tablet Take 10 mg by mouth daily as needed.    gabapentin (NEURONTIN) 300 MG capsule Take 1 capsule (300 mg total) by mouth every evening.    letrozole (FEMARA) 2.5 mg Tab Take 2.5 mg by mouth nightly.    letrozole (FEMARA) 2.5 mg Tab Take 1 tablet (2.5 mg total) by mouth once daily.    metoprolol succinate (TOPROL-XL) 25 MG 24 hr tablet Take 25 mg by mouth every evening.    rOPINIRole (REQUIP) 1 MG tablet Take 2 mg by mouth nightly.    tiZANidine (ZANAFLEX) 4 MG tablet Take 4 mg by mouth nightly as  needed.    traMADoL (ULTRAM) 50 mg tablet Take 1 tablet (50 mg total) by mouth every 4 (four) hours as needed (as needed for pain score 1-5).    vitamin D (VITAMIN D3) 1000 units Tab Take 1,000 Units by mouth once daily.    vitamin E 400 UNIT capsule Take 400 Units by mouth.    XARELTO 20 mg Tab Take 20 mg by mouth once daily. Last dose to be on Sunday July 31st.    pregabalin (LYRICA) 50 MG capsule Take 50 mg by mouth every evening.     No current facility-administered medications for this visit.       Review of patient's allergies indicates:   Allergen Reactions    Loratadine-pseudoephedrine Rash and Shortness Of Breath     Other reaction(s): hives, pruritis       Family History   Family history unknown: Yes       Social History     Socioeconomic History    Marital status:    Tobacco Use    Smoking status: Never    Smokeless tobacco: Never   Substance and Sexual Activity    Alcohol use: Yes     Alcohol/week: 1.0 standard drink     Types: 1 Glasses of wine per week     Comment: occas    Drug use: Never    Sexual activity: Not Currently           Review of Systems:    Constitution: Negative for chills, fever, and sweats.  Negative for unexplained weight loss.    HENT:  Negative for headaches and blurry vision.    Cardiovascular:Negative for chest pain or irregular heart beat. Negative for hypertension.    Respiratory:  Negative for cough and shortness of breath.    Gastrointestinal: Negative for abdominal pain, heartburn, melena, nausea, and vomitting.    Genitourinary:  Negative bladder incontinence and dysuria.    Musculoskeletal:  See HPI    Neurological: Negative for numbness.    Psychiatric/Behavioral: Negative for depression.  The patient is not nervous/anxious.      Endocrine: Negative for polyuria    Hematologic/Lymphatic: Negative for bleeding problem.  Does not bruise/bleed easily.    Skin: Negative for poor would healing and rash      Physical Examination:    Vital Signs:    Vitals:    06/09/23  0825   BP: 132/74   Pulse: 72       Body mass index is 31.01 kg/m².    General: No acute distress, alert and oriented, healthy appearing    HEENT: Head is atraumatic, mucous membranes are moist    Neck: Supples, no JVD    Cardiovascular: Palpable dorsalis pedis and posterior tibial pulses, regular rate and rhythm to those pulses    Lungs: Breathing non-labored    Skin: no rashes appreciated    Right hip:  Range of motion right hip without significant pain or discomfort.  Brisk cap refill distally.  Sensation intact to saphenous superficial peroneal distribution.  Decreased sensation to sural.  Patient with positive EHL.  No tibialis anterior.    X-rays:  Three views of the right hip reviewed.  Patient's implants well fixed.  No signs of loosening or subsidence noted     Assessment::  Status post total hip arthroplasty   Right footdrop    Plan:  Patient's hip is doing well.  Her main issue is her ongoing footdrop.  We did discuss treatment options today.  She would like to get a 2nd opinion.  We will plan to send her foot and ankle surgeon to see if she is candidate for possible tendon transfer.    This note was created using "[employer] voice recognition software that occasionally misinterpreted phrases or words.    Consult note is delivered via Epic messaging service.

## 2023-06-12 ENCOUNTER — OFFICE VISIT (OUTPATIENT)
Dept: HEMATOLOGY/ONCOLOGY | Facility: CLINIC | Age: 73
End: 2023-06-12
Payer: MEDICARE

## 2023-06-12 VITALS
WEIGHT: 218.69 LBS | DIASTOLIC BLOOD PRESSURE: 85 MMHG | BODY MASS INDEX: 30.62 KG/M2 | HEART RATE: 74 BPM | RESPIRATION RATE: 14 BRPM | TEMPERATURE: 98 F | OXYGEN SATURATION: 95 % | HEIGHT: 71 IN | SYSTOLIC BLOOD PRESSURE: 154 MMHG

## 2023-06-12 DIAGNOSIS — Z12.31 ENCOUNTER FOR SCREENING MAMMOGRAM FOR BREAST CANCER: ICD-10-CM

## 2023-06-12 DIAGNOSIS — M81.0 OSTEOPOROSIS, UNSPECIFIED OSTEOPOROSIS TYPE, UNSPECIFIED PATHOLOGICAL FRACTURE PRESENCE: ICD-10-CM

## 2023-06-12 DIAGNOSIS — Z17.0 MALIGNANT NEOPLASM OF UPPER-OUTER QUADRANT OF RIGHT BREAST IN FEMALE, ESTROGEN RECEPTOR POSITIVE: Primary | ICD-10-CM

## 2023-06-12 DIAGNOSIS — M85.89 OSTEOPENIA OF MULTIPLE SITES: ICD-10-CM

## 2023-06-12 DIAGNOSIS — C50.411 MALIGNANT NEOPLASM OF UPPER-OUTER QUADRANT OF RIGHT BREAST IN FEMALE, ESTROGEN RECEPTOR POSITIVE: Primary | ICD-10-CM

## 2023-06-12 PROCEDURE — 99999 PR PBB SHADOW E&M-EST. PATIENT-LVL V: ICD-10-PCS | Mod: PBBFAC,,, | Performed by: NURSE PRACTITIONER

## 2023-06-12 PROCEDURE — 99999 PR PBB SHADOW E&M-EST. PATIENT-LVL V: CPT | Mod: PBBFAC,,, | Performed by: NURSE PRACTITIONER

## 2023-06-12 PROCEDURE — 99214 OFFICE O/P EST MOD 30 MIN: CPT | Mod: S$PBB,,, | Performed by: NURSE PRACTITIONER

## 2023-06-12 PROCEDURE — 99215 OFFICE O/P EST HI 40 MIN: CPT | Mod: PBBFAC | Performed by: NURSE PRACTITIONER

## 2023-06-12 PROCEDURE — 99214 PR OFFICE/OUTPT VISIT, EST, LEVL IV, 30-39 MIN: ICD-10-PCS | Mod: S$PBB,,, | Performed by: NURSE PRACTITIONER

## 2023-06-12 RX ORDER — LETROZOLE 2.5 MG/1
2.5 TABLET, FILM COATED ORAL DAILY
Qty: 90 TABLET | Refills: 3 | Status: SHIPPED | OUTPATIENT
Start: 2023-06-12 | End: 2023-09-10

## 2023-11-07 ENCOUNTER — INFUSION (OUTPATIENT)
Dept: INFUSION THERAPY | Facility: HOSPITAL | Age: 73
End: 2023-11-07
Attending: INTERNAL MEDICINE
Payer: MEDICARE

## 2023-11-07 VITALS
SYSTOLIC BLOOD PRESSURE: 163 MMHG | RESPIRATION RATE: 16 BRPM | TEMPERATURE: 98 F | OXYGEN SATURATION: 95 % | HEART RATE: 70 BPM | HEIGHT: 69 IN | BODY MASS INDEX: 32.3 KG/M2 | DIASTOLIC BLOOD PRESSURE: 80 MMHG

## 2023-11-07 DIAGNOSIS — M85.88 OSTEOPENIA OF SPINE: Primary | ICD-10-CM

## 2023-11-07 PROCEDURE — 63600175 PHARM REV CODE 636 W HCPCS: Mod: JZ,JG | Performed by: NURSE PRACTITIONER

## 2023-11-07 PROCEDURE — 96372 THER/PROPH/DIAG INJ SC/IM: CPT

## 2023-11-07 RX ADMIN — DENOSUMAB 60 MG: 60 INJECTION SUBCUTANEOUS at 10:11

## 2024-01-17 NOTE — PROGRESS NOTES
Subjective:       Patient ID: Candace Macias is a 73 y.o. female.    Surgeons: Dr. Rambo Cassidy/Dr. Delonte Stevens  PCP: Dr. Jamari Larsen    Right Breast Cancer stage IIA (T2N0M0) diagnosed 18  Biopsy/pathology: Right breast 10:00 core biopsy done 18--invasive ductal carcinoma, high grade, measuring at least 0.7cm, DCIS minor component solid pattern, nuclear grade 3/3 without necrosis, ER 98%, MD 38%, Her 2 Equivocal 2+ by IHC, non-amplified by FISH, Ki67 10.5%.  Surgery/pathology: Bilateral mastectomies with reconstruction done 18--invasive ductal carcinoma measures 2.3cm, grade 3, margins clear, associated DCIS high grade, 2 lymph nodes negative. Oncotype DX testing done showed tumor high risk with score of 33, risk of 9-year distant recurrence 21% with >15% benefit from chemotherapy.  Imagin. Screening MMG/US done 18 [facility]--1cm lobulated mass right breast UOQ on MMG, two hypoechoic masses right breast UOQ, more superficial mass measures 0.7cm, second mass more posterior and measures 0.7cm, BIRADS 0 incomplete.  2. Right breast and axillary US 18 [facility]--2 masses along the 10:00 position right breast, one measures 0.9cm with microlobulated and angular margins, another more posterior measures 1.2cm, two masses are 0.7cm apart with longest length 2.1cm, both are highly suspicious for malignancy suggesting multifocal invasive carcinoma.    DEXA:  02/23/15 Dr. Sanders's office AP spine T= -1.33, Femoral neck -1.8, total left hip -1.09 c/w osteopenia.  19 [facility] AP spine T= -2.5, Femur neck left -1.3, right -2.1, total hip left -1.5, right -1.8 c/w osteoporosis in spine.  21 [facility] AP spine -1.5, Right femoral neck -1.4, Total right -1.1, Left radius -1.0. Consistent with improvement.     Treatment history:  Bilateral mastectomies with reconstruction 18.  Reconstruction revision 5/15/19.  Treatment recommended with Taxotere/Cytoxan X 4 cycles but decided  against treatment    Current treatment plan:  1. Femara started 2/12/19--plan for 10 years  2. Prolia started 8/13/19 for osteoporosis--next dose due May 2024.      Chief Complaint: Other Misc (Pt reports no concerns today.)    HPI  Patient is here for follow-up of breast cancer. She is feeling great. She continues on Femara and is doing well. She had right hip surgery that was further complicated with right lower extremity neuropathy. She continues with PT. Her right lower extremity is still swollen and has limited ROM but she is able to drive now. Otherwise no other problems reported.     Past Medical History:   Diagnosis Date    A-fib     Asthma     Breast cancer     Fibromyalgia     Hypertension     Osteoarthritis       Review of patient's allergies indicates:   Allergen Reactions    Loratadine-pseudoephedrine Rash and Shortness Of Breath     Other reaction(s): hives, pruritis        Current Outpatient Medications:     amitriptyline (ELAVIL) 50 MG tablet, Take 50 mg by mouth nightly., Disp: , Rfl:     ascorbic acid, vitamin C, (VITAMIN C) 1000 MG tablet, Take 1,000 mg by mouth once daily., Disp: , Rfl:     bisacodyL (DULCOLAX) 5 mg EC tablet, Take 10 mg by mouth daily as needed., Disp: , Rfl:     letrozole (FEMARA) 2.5 mg Tab, Take 2.5 mg by mouth once daily., Disp: , Rfl:     metoprolol succinate (TOPROL-XL) 25 MG 24 hr tablet, Take 25 mg by mouth every evening., Disp: , Rfl:     pregabalin (LYRICA) 50 MG capsule, Take 50 mg by mouth every evening., Disp: , Rfl:     rOPINIRole (REQUIP) 1 MG tablet, Take 2 mg by mouth nightly., Disp: , Rfl:     tiZANidine (ZANAFLEX) 4 MG tablet, Take 4 mg by mouth nightly as needed., Disp: , Rfl:     traMADoL (ULTRAM) 50 mg tablet, Take 1 tablet (50 mg total) by mouth every 4 (four) hours as needed (as needed for pain score 1-5)., Disp: 28 tablet, Rfl: 0    vitamin D (VITAMIN D3) 1000 units Tab, Take 1,000 Units by mouth once daily., Disp: , Rfl:     vitamin E 400 UNIT capsule,  Take 400 Units by mouth., Disp: , Rfl:     XARELTO 20 mg Tab, Take 20 mg by mouth once daily. Last dose to be on Sunday July 31st., Disp: , Rfl:     amitriptyline (ELAVIL) 50 MG tablet, Take 1 tablet by mouth every evening., Disp: , Rfl:     gabapentin (NEURONTIN) 300 MG capsule, Take 1 capsule (300 mg total) by mouth every evening. (Patient not taking: Reported on 1/22/2024), Disp: 30 capsule, Rfl: 11  Review of Systems   Constitutional:  Negative for activity change, fever and unexpected weight change.   Eyes:  Negative for visual disturbance.   Respiratory:  Negative for cough and shortness of breath.    Cardiovascular:  Negative for chest pain.   Gastrointestinal:  Negative for abdominal pain, blood in stool, constipation, diarrhea, nausea and vomiting.   Genitourinary:  Negative for difficulty urinating.   Musculoskeletal:  Negative for back pain.        S/p right hip surgery   Integumentary:  Negative for rash.   Neurological:  Negative for dizziness, weakness and headaches.        Numbness and limited ROM to right lower extremity   Psychiatric/Behavioral:  Negative for behavioral problems and suicidal ideas.          Vitals:    01/22/24 1109   BP: 130/80   Pulse: 67   Resp: 14   Temp: 98.1 °F (36.7 °C)     Wt Readings from Last 3 Encounters:   01/22/24 1109 102.9 kg (226 lb 14.4 oz)   06/12/23 1003 99.2 kg (218 lb 11.2 oz)   06/09/23 0825 95.3 kg (210 lb)   06/09/23 0824 95.3 kg (210 lb)      Physical Exam  Vitals reviewed.   Constitutional:       Appearance: Normal appearance. She is overweight.   HENT:      Head: Normocephalic.   Eyes:      General: Lids are normal. Vision grossly intact.      Extraocular Movements: Extraocular movements intact.      Conjunctiva/sclera: Conjunctivae normal.   Cardiovascular:      Rate and Rhythm: Normal rate and regular rhythm.      Pulses: Normal pulses.      Heart sounds: Normal heart sounds, S1 normal and S2 normal.   Pulmonary:      Effort: Pulmonary effort is normal.       Breath sounds: Normal breath sounds.   Chest:      Comments: S/p bilateral nipple-sparing mastectomies with reconstruction, no masses, no palpable adenopathy.  Abdominal:      General: Abdomen is protuberant. Bowel sounds are normal.      Palpations: Abdomen is soft.   Musculoskeletal:      Cervical back: Normal range of motion and neck supple.      Right lower le+ Edema present.      Comments: Right foot drop   Skin:     General: Skin is warm and moist.      Capillary Refill: Capillary refill takes less than 2 seconds.   Neurological:      General: No focal deficit present.      Mental Status: She is alert and oriented to person, place, and time.   Psychiatric:         Attention and Perception: Attention normal.         Mood and Affect: Mood normal.         Speech: Speech normal.         Behavior: Behavior normal. Behavior is cooperative.         Cognition and Memory: Cognition normal.         Judgment: Judgment normal.       ECOG SCORE    1 - Restricted in strenuous activity-ambulatory and able to carry out work of a light nature       Lab Visit on 2024   Component Date Value    Sodium Level 2024 141     Potassium Level 2024 4.2     Chloride 2024 108 (H)     Carbon Dioxide 2024 28     Glucose Level 2024 91     Blood Urea Nitrogen 2024 15.8     Creatinine 2024 0.87     Calcium Level Total 2024 8.7     Protein Total 2024 6.4     Albumin Level 2024 3.4     Globulin 2024 3.0     Albumin/Globulin Ratio 2024 1.1     Bilirubin Total 2024 0.3     Alkaline Phosphatase 2024 64     Alanine Aminotransferase 2024 8     Aspartate Aminotransfera* 2024 14     eGFR 2024 >60     WBC 2024 4.38 (L)     RBC 2024 4.39     Hgb 2024 13.0     Hct 2024 40.8     MCV 2024 92.9     MCH 2024 29.6     MCHC 2024 31.9 (L)     RDW 2024 12.9     Platelet 2024 221     MPV  01/19/2024 9.1     Neut % 01/19/2024 42.4     Lymph % 01/19/2024 45.2     Mono % 01/19/2024 9.4     Eos % 01/19/2024 2.1     Basophil % 01/19/2024 0.7     Lymph # 01/19/2024 1.98     Neut # 01/19/2024 1.86 (L)     Mono # 01/19/2024 0.41     Eos # 01/19/2024 0.09     Baso # 01/19/2024 0.03     IG# 01/19/2024 0.01     IG% 01/19/2024 0.2           Assessment:       Problem List Items Addressed This Visit          Oncology    Malignant neoplasm of breast - Primary    Relevant Orders    CBC Auto Differential    Comprehensive Metabolic Panel       Orthopedic    Osteopenia of spine    Relevant Orders    CBC Auto Differential    Comprehensive Metabolic Panel     Other Visit Diagnoses       Encounter for screening mammogram for breast cancer        Relevant Orders    Mammo Digital Screening Bilat w/ Gavin               Plan:       Patient with stage IIA right breast cancer s/p bilateral mastectomies with reconstruction done 11/13/18 in [place] with Dr. Rambo Cassidy. Tumor measured 2.3cm, ER and OH strongly positive and Her2 negative with negative sentinel lymph nodes.  Per NCCN guidelines, Oncotype DX testing is recommended.  Patient was told by her surgeon that she would not need chemotherapy and I am not sure why this happened and she is upset. I explained decision making in detail, went over NCCN guidelines recommending oncotype DX testing.  Oncotype DX score returned 33, high risk. Explained that risk of recurrence with AI alone is 21% and that chemotherapy will add benefit of >15%.  I recommended treatment with Taxotere/Cytoxan X 4 cycles in addition to AI.  Patient ultimately decided against chemotherapy.    Currently patient is doing well without any signs or symptoms to suggest disease recurrence.  Femara started 2/12/19 and she is tolerating well.  DEXA from 8/2/19 showed osteoporosis in spine and osteopenia in hips. Prolia started every 6 months on 8/13/19. Repeat DEXA on 9/13/21 showed significant  improvement.  Will notify our breast center that she is overdue for repeat DEXA.  Continue Prolia. Next dose due May 2024.   Continue Femara.  Continue calcium and vitamin D.   Recent labs all good.   Will change to annual surveillance appointments after since she is now >5 years from diagnosis.  Since she has nipple-sparing mastectomies, she can have annual screening MMGs per NCCN guidelines. Will send orders to [facility].   All questions answered at this time.        EDGARD Patricia

## 2024-01-18 NOTE — PLAN OF CARE
S/p THR. Spk w patient -- pt lives alone but granddaughter, Miguelina will come live with pt & asst w homecare. Pt has RW, BSC, and SHOWER CHAIR from previous hip surgery ( 6/2020).  Provider list given. Foc obtained. Pt used [facility] in past & request resumption.   Called/faxed referral for hh to [facility]. Plan discharge today.   Called/faxed referral for outpt therapy to [facility]-- to start in 2 weeks. They will contact pt with appointment date & time.  Pcp: Dr. Larsen  Rx: [facility] ( [place]).      Contact # Miguelina [phone]   Not Applicable

## 2024-01-19 ENCOUNTER — LAB VISIT (OUTPATIENT)
Dept: LAB | Facility: HOSPITAL | Age: 74
End: 2024-01-19
Attending: NURSE PRACTITIONER
Payer: MEDICARE

## 2024-01-19 DIAGNOSIS — M81.0 OSTEOPOROSIS, UNSPECIFIED OSTEOPOROSIS TYPE, UNSPECIFIED PATHOLOGICAL FRACTURE PRESENCE: ICD-10-CM

## 2024-01-19 DIAGNOSIS — Z17.0 MALIGNANT NEOPLASM OF UPPER-OUTER QUADRANT OF RIGHT BREAST IN FEMALE, ESTROGEN RECEPTOR POSITIVE: ICD-10-CM

## 2024-01-19 DIAGNOSIS — M85.89 OSTEOPENIA OF MULTIPLE SITES: ICD-10-CM

## 2024-01-19 DIAGNOSIS — C50.411 MALIGNANT NEOPLASM OF UPPER-OUTER QUADRANT OF RIGHT BREAST IN FEMALE, ESTROGEN RECEPTOR POSITIVE: ICD-10-CM

## 2024-01-19 LAB
ALBUMIN SERPL-MCNC: 3.4 G/DL (ref 3.4–4.8)
ALBUMIN/GLOB SERPL: 1.1 RATIO (ref 1.1–2)
ALP SERPL-CCNC: 64 UNIT/L (ref 40–150)
ALT SERPL-CCNC: 8 UNIT/L (ref 0–55)
AST SERPL-CCNC: 14 UNIT/L (ref 5–34)
BASOPHILS # BLD AUTO: 0.03 X10(3)/MCL
BASOPHILS NFR BLD AUTO: 0.7 %
BILIRUB SERPL-MCNC: 0.3 MG/DL
BUN SERPL-MCNC: 15.8 MG/DL (ref 9.8–20.1)
CALCIUM SERPL-MCNC: 8.7 MG/DL (ref 8.4–10.2)
CHLORIDE SERPL-SCNC: 108 MMOL/L (ref 98–107)
CO2 SERPL-SCNC: 28 MMOL/L (ref 23–31)
CREAT SERPL-MCNC: 0.87 MG/DL (ref 0.55–1.02)
EOSINOPHIL # BLD AUTO: 0.09 X10(3)/MCL (ref 0–0.9)
EOSINOPHIL NFR BLD AUTO: 2.1 %
ERYTHROCYTE [DISTWIDTH] IN BLOOD BY AUTOMATED COUNT: 12.9 % (ref 11.5–17)
GFR SERPLBLD CREATININE-BSD FMLA CKD-EPI: >60 MLS/MIN/1.73/M2
GLOBULIN SER-MCNC: 3 GM/DL (ref 2.4–3.5)
GLUCOSE SERPL-MCNC: 91 MG/DL (ref 82–115)
HCT VFR BLD AUTO: 40.8 % (ref 37–47)
HGB BLD-MCNC: 13 G/DL (ref 12–16)
IMM GRANULOCYTES # BLD AUTO: 0.01 X10(3)/MCL (ref 0–0.04)
IMM GRANULOCYTES NFR BLD AUTO: 0.2 %
LYMPHOCYTES # BLD AUTO: 1.98 X10(3)/MCL (ref 0.6–4.6)
LYMPHOCYTES NFR BLD AUTO: 45.2 %
MCH RBC QN AUTO: 29.6 PG (ref 27–31)
MCHC RBC AUTO-ENTMCNC: 31.9 G/DL (ref 33–36)
MCV RBC AUTO: 92.9 FL (ref 80–94)
MONOCYTES # BLD AUTO: 0.41 X10(3)/MCL (ref 0.1–1.3)
MONOCYTES NFR BLD AUTO: 9.4 %
NEUTROPHILS # BLD AUTO: 1.86 X10(3)/MCL (ref 2.1–9.2)
NEUTROPHILS NFR BLD AUTO: 42.4 %
PLATELET # BLD AUTO: 221 X10(3)/MCL (ref 130–400)
PMV BLD AUTO: 9.1 FL (ref 7.4–10.4)
POTASSIUM SERPL-SCNC: 4.2 MMOL/L (ref 3.5–5.1)
PROT SERPL-MCNC: 6.4 GM/DL (ref 5.8–7.6)
RBC # BLD AUTO: 4.39 X10(6)/MCL (ref 4.2–5.4)
SODIUM SERPL-SCNC: 141 MMOL/L (ref 136–145)
WBC # SPEC AUTO: 4.38 X10(3)/MCL (ref 4.5–11.5)

## 2024-01-19 PROCEDURE — 36415 COLL VENOUS BLD VENIPUNCTURE: CPT

## 2024-01-19 PROCEDURE — 85025 COMPLETE CBC W/AUTO DIFF WBC: CPT

## 2024-01-19 PROCEDURE — 80053 COMPREHEN METABOLIC PANEL: CPT

## 2024-01-22 ENCOUNTER — OFFICE VISIT (OUTPATIENT)
Dept: HEMATOLOGY/ONCOLOGY | Facility: CLINIC | Age: 74
End: 2024-01-22
Payer: MEDICARE

## 2024-01-22 VITALS
HEART RATE: 67 BPM | BODY MASS INDEX: 33.6 KG/M2 | RESPIRATION RATE: 14 BRPM | DIASTOLIC BLOOD PRESSURE: 80 MMHG | OXYGEN SATURATION: 98 % | HEIGHT: 69 IN | WEIGHT: 226.88 LBS | TEMPERATURE: 98 F | SYSTOLIC BLOOD PRESSURE: 130 MMHG

## 2024-01-22 DIAGNOSIS — Z12.31 ENCOUNTER FOR SCREENING MAMMOGRAM FOR BREAST CANCER: ICD-10-CM

## 2024-01-22 DIAGNOSIS — C50.411 MALIGNANT NEOPLASM OF UPPER-OUTER QUADRANT OF RIGHT BREAST IN FEMALE, ESTROGEN RECEPTOR POSITIVE: Primary | ICD-10-CM

## 2024-01-22 DIAGNOSIS — M85.88 OSTEOPENIA OF SPINE: ICD-10-CM

## 2024-01-22 DIAGNOSIS — Z17.0 MALIGNANT NEOPLASM OF UPPER-OUTER QUADRANT OF RIGHT BREAST IN FEMALE, ESTROGEN RECEPTOR POSITIVE: Primary | ICD-10-CM

## 2024-01-22 PROCEDURE — 99214 OFFICE O/P EST MOD 30 MIN: CPT | Mod: S$PBB,,, | Performed by: NURSE PRACTITIONER

## 2024-01-22 PROCEDURE — 99215 OFFICE O/P EST HI 40 MIN: CPT | Mod: PBBFAC | Performed by: NURSE PRACTITIONER

## 2024-01-22 PROCEDURE — 99999 PR PBB SHADOW E&M-EST. PATIENT-LVL V: CPT | Mod: PBBFAC,,, | Performed by: NURSE PRACTITIONER

## 2024-01-22 RX ORDER — LETROZOLE 2.5 MG/1
2.5 TABLET, FILM COATED ORAL DAILY
COMMUNITY
Start: 2024-01-05 | End: 2024-05-06

## 2024-01-22 RX ORDER — AMITRIPTYLINE HYDROCHLORIDE 50 MG/1
1 TABLET, FILM COATED ORAL NIGHTLY
COMMUNITY
Start: 2023-05-23 | End: 2024-05-22

## 2024-01-25 ENCOUNTER — OFFICE VISIT (OUTPATIENT)
Dept: ORTHOPEDICS | Facility: CLINIC | Age: 74
End: 2024-01-25
Payer: MEDICARE

## 2024-01-25 ENCOUNTER — HOSPITAL ENCOUNTER (OUTPATIENT)
Dept: RADIOLOGY | Facility: CLINIC | Age: 74
Discharge: HOME | End: 2024-01-25
Attending: ORTHOPAEDIC SURGERY
Payer: MEDICARE

## 2024-01-25 VITALS
HEIGHT: 69 IN | SYSTOLIC BLOOD PRESSURE: 138 MMHG | BODY MASS INDEX: 33.77 KG/M2 | HEART RATE: 73 BPM | DIASTOLIC BLOOD PRESSURE: 79 MMHG | WEIGHT: 228 LBS

## 2024-01-25 DIAGNOSIS — M25.561 ACUTE PAIN OF RIGHT KNEE: ICD-10-CM

## 2024-01-25 DIAGNOSIS — M17.11 PRIMARY OSTEOARTHRITIS OF RIGHT KNEE: ICD-10-CM

## 2024-01-25 DIAGNOSIS — M25.561 ACUTE PAIN OF RIGHT KNEE: Primary | ICD-10-CM

## 2024-01-25 PROCEDURE — 99213 OFFICE O/P EST LOW 20 MIN: CPT | Mod: 25,,, | Performed by: ORTHOPAEDIC SURGERY

## 2024-01-25 PROCEDURE — 20610 DRAIN/INJ JOINT/BURSA W/O US: CPT | Mod: RT,,, | Performed by: ORTHOPAEDIC SURGERY

## 2024-01-25 PROCEDURE — 73564 X-RAY EXAM KNEE 4 OR MORE: CPT | Mod: RT,,, | Performed by: ORTHOPAEDIC SURGERY

## 2024-01-25 RX ADMIN — BETAMETHASONE SODIUM PHOSPHATE AND BETAMETHASONE ACETATE 12 MG: 3; 3 INJECTION, SUSPENSION INTRA-ARTICULAR; INTRALESIONAL; INTRAMUSCULAR; SOFT TISSUE at 09:01

## 2024-01-25 RX ADMIN — LIDOCAINE HYDROCHLORIDE 5 ML: 20 INJECTION, SOLUTION EPIDURAL; INFILTRATION; INTRACAUDAL; PERINEURAL at 09:01

## 2024-01-25 NOTE — PROGRESS NOTES
Chief Complaint:   Chief Complaint   Patient presents with    Right Hip - Pain, Follow-up     Pt states she is doing great regarding her hip. Pt states she has drop foot and due the way she is walking is aggravating her Rt knee.  Pt states she feels a dull ache/crushing /pressure and would like to x-ray her knee today.        History of present illness:  73-year-old female presents for evaluation follow-up after total hip arthroplasty.  Patient has a liver a year out.  The hip is overall doing well.  She continues have an issue with footdrop.  She is seeing physical therapy for lymphedema.  Main complaint is actually right knee.  She notes significant pain in the anterior lateral aspect of the right knee.  Has significant crepitus range of motion.    Past Medical History:   Diagnosis Date    A-fib     Asthma     Breast cancer     Fibromyalgia     Hypertension     Osteoarthritis        Past Surgical History:   Procedure Laterality Date    APPENDECTOMY      MASTECTOMY Bilateral     ROBOTIC ARTHROPLASTY, HIP Right 8/3/2022    Procedure: ROBOTIC ARTHROPLASTY,HIP;  Surgeon: Osmany Kwon MD;  Location: [facility];  Service: Orthopedics;  Laterality: Right;    TOTAL HIP ARTHROPLASTY Left     PATRICK GIRON       Current Outpatient Medications   Medication Sig    amitriptyline (ELAVIL) 50 MG tablet Take 50 mg by mouth nightly.    amitriptyline (ELAVIL) 50 MG tablet Take 1 tablet by mouth every evening.    ascorbic acid, vitamin C, (VITAMIN C) 1000 MG tablet Take 1,000 mg by mouth once daily.    bisacodyL (DULCOLAX) 5 mg EC tablet Take 10 mg by mouth daily as needed.    letrozole (FEMARA) 2.5 mg Tab Take 2.5 mg by mouth once daily.    metoprolol succinate (TOPROL-XL) 25 MG 24 hr tablet Take 25 mg by mouth every evening.    rOPINIRole (REQUIP) 1 MG tablet Take 2 mg by mouth nightly.    tiZANidine (ZANAFLEX) 4 MG tablet Take 4 mg by mouth nightly as needed.    traMADoL (ULTRAM) 50 mg tablet Take 1 tablet (50 mg total) by mouth  every 4 (four) hours as needed (as needed for pain score 1-5).    vitamin D (VITAMIN D3) 1000 units Tab Take 1,000 Units by mouth once daily.    vitamin E 400 UNIT capsule Take 400 Units by mouth.    XARELTO 20 mg Tab Take 20 mg by mouth once daily. Last dose to be on Sunday July 31st.    gabapentin (NEURONTIN) 300 MG capsule Take 1 capsule (300 mg total) by mouth every evening. (Patient not taking: Reported on 1/22/2024)    pregabalin (LYRICA) 50 MG capsule Take 50 mg by mouth every evening.     No current facility-administered medications for this visit.       Review of patient's allergies indicates:   Allergen Reactions    Loratadine-pseudoephedrine Rash and Shortness Of Breath     Other reaction(s): hives, pruritis       Family History   Family history unknown: Yes       Social History     Socioeconomic History    Marital status:    Tobacco Use    Smoking status: Never    Smokeless tobacco: Never   Substance and Sexual Activity    Alcohol use: Yes     Alcohol/week: 1.0 standard drink of alcohol     Types: 1 Glasses of wine per week     Comment: occas    Drug use: Never    Sexual activity: Not Currently           Review of Systems:    Constitution: Negative for chills, fever, and sweats.  Negative for unexplained weight loss.    HENT:  Negative for headaches and blurry vision.    Cardiovascular:Negative for chest pain or irregular heart beat. Negative for hypertension.    Respiratory:  Negative for cough and shortness of breath.    Gastrointestinal: Negative for abdominal pain, heartburn, melena, nausea, and vomitting.    Genitourinary:  Negative bladder incontinence and dysuria.    Musculoskeletal:  See HPI    Neurological: Negative for numbness.    Psychiatric/Behavioral: Negative for depression.  The patient is not nervous/anxious.      Endocrine: Negative for polyuria    Hematologic/Lymphatic: Negative for bleeding problem.  Does not bruise/bleed easily.    Skin: Negative for poor would healing and  rash      Physical Examination:    Vital Signs:    Vitals:    01/25/24 0932   BP: 138/79   Pulse: 73       Body mass index is 34.16 kg/m².    General: No acute distress, alert and oriented, healthy appearing    HEENT: Head is atraumatic, mucous membranes are moist    Neck: Supples, no JVD    Cardiovascular: Palpable dorsalis pedis and posterior tibial pulses, regular rate and rhythm to those pulses    Lungs: Breathing non-labored    Skin: no rashes appreciated    Neurologic: Can flex and extend knees, ankles, and toes. Sensation is grossly intact    Right knee:  Brisk cap refill disappeared states that disappeared range of motion of the right knee is from 0-115.  She has patellofemoral crepitus range of motion.  Stable to varus and valgus.  Stable anterior-posterior drawer    X-rays:  Four views right knee reviewed.  Patient with patellofemoral osteoarthritis.  She has well-maintained joint space to mediolateral joint line however she does have periarticular osteophytes     Assessment::  Right knee osteoarthritis    Plan:  Cortisone injections today.  We could try a Synvisc injection in the future if this fails to relieve her symptoms long-term.  See her back in 3 months to discuss further treatment options were sooner if she has worsening pain    This note was created using [employer] voice recognition software that occasionally misinterpreted phrases or words.    Consult note is delivered via Epic messaging service.

## 2024-02-05 RX ORDER — LIDOCAINE HYDROCHLORIDE 20 MG/ML
5 INJECTION, SOLUTION EPIDURAL; INFILTRATION; INTRACAUDAL; PERINEURAL
Status: SHIPPED | OUTPATIENT
Start: 2024-01-25

## 2024-02-05 RX ORDER — BETAMETHASONE SODIUM PHOSPHATE AND BETAMETHASONE ACETATE 3; 3 MG/ML; MG/ML
12 INJECTION, SUSPENSION INTRA-ARTICULAR; INTRALESIONAL; INTRAMUSCULAR; SOFT TISSUE
Status: SHIPPED | OUTPATIENT
Start: 2024-01-25

## 2024-02-05 NOTE — PROCEDURES
Large Joint Aspiration/Injection: R knee    Date/Time: 1/25/2024 9:00 AM    Performed by: Osmany Kwon MD  Authorized by: Osmany Kwon MD    Consent Done?:  Yes (Verbal)  Indications:  Arthritis  Timeout: prior to procedure the correct patient, procedure, and site was verified    Prep: patient was prepped and draped in usual sterile fashion      Details:  Needle Size:  22 G  Approach:  Anterolateral  Location:  Knee  Site:  R knee  Medications:  5 mL LIDOcaine (PF) 20 mg/mL (2%) 20 mg/mL (2 %); 12 mg betamethasone acetate-betamethasone sodium phosphate 6 mg/mL

## 2024-05-05 DIAGNOSIS — M85.89 OTHER SPECIFIED DISORDERS OF BONE DENSITY AND STRUCTURE, MULTIPLE SITES: ICD-10-CM

## 2024-05-05 DIAGNOSIS — C50.411 MALIGNANT NEOPLASM OF UPPER-OUTER QUADRANT OF RIGHT FEMALE BREAST: ICD-10-CM

## 2024-05-06 ENCOUNTER — TELEPHONE (OUTPATIENT)
Dept: HEMATOLOGY/ONCOLOGY | Facility: CLINIC | Age: 74
End: 2024-05-06
Payer: MEDICARE

## 2024-05-06 RX ORDER — LETROZOLE 2.5 MG/1
2.5 TABLET, FILM COATED ORAL
Qty: 90 TABLET | Refills: 2 | Status: SHIPPED | OUTPATIENT
Start: 2024-05-06

## 2024-05-13 ENCOUNTER — INFUSION (OUTPATIENT)
Dept: INFUSION THERAPY | Facility: HOSPITAL | Age: 74
End: 2024-05-13
Attending: INTERNAL MEDICINE
Payer: MEDICARE

## 2024-05-13 VITALS — SYSTOLIC BLOOD PRESSURE: 137 MMHG | DIASTOLIC BLOOD PRESSURE: 69 MMHG | TEMPERATURE: 99 F | HEART RATE: 92 BPM

## 2024-05-13 DIAGNOSIS — M85.88 OSTEOPENIA OF SPINE: Primary | ICD-10-CM

## 2024-05-13 PROCEDURE — 96413 CHEMO IV INFUSION 1 HR: CPT

## 2024-05-13 PROCEDURE — 96372 THER/PROPH/DIAG INJ SC/IM: CPT

## 2024-05-13 PROCEDURE — 63600175 PHARM REV CODE 636 W HCPCS: Mod: JZ,JG | Performed by: NURSE PRACTITIONER

## 2024-05-13 RX ADMIN — DENOSUMAB 60 MG: 60 INJECTION SUBCUTANEOUS at 03:05

## 2024-11-08 ENCOUNTER — LAB VISIT (OUTPATIENT)
Dept: LAB | Facility: HOSPITAL | Age: 74
End: 2024-11-08
Attending: NURSE PRACTITIONER
Payer: MEDICARE

## 2024-11-08 DIAGNOSIS — C50.411 MALIGNANT NEOPLASM OF UPPER-OUTER QUADRANT OF RIGHT FEMALE BREAST, UNSPECIFIED ESTROGEN RECEPTOR STATUS: ICD-10-CM

## 2024-11-08 DIAGNOSIS — C50.411 MALIGNANT NEOPLASM OF UPPER-OUTER QUADRANT OF RIGHT FEMALE BREAST, UNSPECIFIED ESTROGEN RECEPTOR STATUS: Primary | ICD-10-CM

## 2024-11-08 LAB
ALBUMIN SERPL-MCNC: 3.5 G/DL (ref 3.4–4.8)
ALBUMIN/GLOB SERPL: 1 RATIO (ref 1.1–2)
ALP SERPL-CCNC: 73 UNIT/L (ref 40–150)
ALT SERPL-CCNC: 10 UNIT/L (ref 0–55)
ANION GAP SERPL CALC-SCNC: 7 MEQ/L
AST SERPL-CCNC: 15 UNIT/L (ref 5–34)
BILIRUB SERPL-MCNC: 0.4 MG/DL
BUN SERPL-MCNC: 14.3 MG/DL (ref 9.8–20.1)
CALCIUM SERPL-MCNC: 9.4 MG/DL (ref 8.4–10.2)
CHLORIDE SERPL-SCNC: 108 MMOL/L (ref 98–107)
CO2 SERPL-SCNC: 29 MMOL/L (ref 23–31)
CREAT SERPL-MCNC: 0.95 MG/DL (ref 0.55–1.02)
CREAT/UREA NIT SERPL: 15
GFR SERPLBLD CREATININE-BSD FMLA CKD-EPI: >60 ML/MIN/1.73/M2
GLOBULIN SER-MCNC: 3.4 GM/DL (ref 2.4–3.5)
GLUCOSE SERPL-MCNC: 95 MG/DL (ref 82–115)
POTASSIUM SERPL-SCNC: 5.2 MMOL/L (ref 3.5–5.1)
PROT SERPL-MCNC: 6.9 GM/DL (ref 5.8–7.6)
SODIUM SERPL-SCNC: 144 MMOL/L (ref 136–145)

## 2024-11-08 PROCEDURE — 36415 COLL VENOUS BLD VENIPUNCTURE: CPT

## 2024-11-08 PROCEDURE — 80053 COMPREHEN METABOLIC PANEL: CPT

## 2024-11-11 ENCOUNTER — INFUSION (OUTPATIENT)
Dept: INFUSION THERAPY | Facility: HOSPITAL | Age: 74
End: 2024-11-11
Attending: INTERNAL MEDICINE
Payer: MEDICARE

## 2024-11-11 VITALS — DIASTOLIC BLOOD PRESSURE: 77 MMHG | HEART RATE: 87 BPM | TEMPERATURE: 99 F | SYSTOLIC BLOOD PRESSURE: 162 MMHG

## 2024-11-11 DIAGNOSIS — M85.88 OSTEOPENIA OF SPINE: Primary | ICD-10-CM

## 2024-11-11 PROCEDURE — 96372 THER/PROPH/DIAG INJ SC/IM: CPT

## 2024-11-11 PROCEDURE — 63600175 PHARM REV CODE 636 W HCPCS: Mod: JZ,JG | Performed by: NURSE PRACTITIONER

## 2024-11-11 RX ADMIN — DENOSUMAB 60 MG: 60 INJECTION SUBCUTANEOUS at 01:11

## 2025-01-17 ENCOUNTER — LAB VISIT (OUTPATIENT)
Dept: LAB | Facility: HOSPITAL | Age: 75
End: 2025-01-17
Attending: INTERNAL MEDICINE
Payer: MEDICARE

## 2025-01-17 DIAGNOSIS — M85.88 OSTEOPENIA OF SPINE: ICD-10-CM

## 2025-01-17 DIAGNOSIS — C50.411 MALIGNANT NEOPLASM OF UPPER-OUTER QUADRANT OF RIGHT BREAST IN FEMALE, ESTROGEN RECEPTOR POSITIVE: ICD-10-CM

## 2025-01-17 DIAGNOSIS — Z17.0 MALIGNANT NEOPLASM OF UPPER-OUTER QUADRANT OF RIGHT BREAST IN FEMALE, ESTROGEN RECEPTOR POSITIVE: ICD-10-CM

## 2025-01-17 LAB
ALBUMIN SERPL-MCNC: 3.4 G/DL (ref 3.4–4.8)
ALBUMIN/GLOB SERPL: 1 RATIO (ref 1.1–2)
ALP SERPL-CCNC: 65 UNIT/L (ref 40–150)
ALT SERPL-CCNC: 9 UNIT/L (ref 0–55)
ANION GAP SERPL CALC-SCNC: 8 MEQ/L
AST SERPL-CCNC: 11 UNIT/L (ref 5–34)
BASOPHILS # BLD AUTO: 0.02 X10(3)/MCL
BASOPHILS NFR BLD AUTO: 0.2 %
BILIRUB SERPL-MCNC: 0.2 MG/DL
BUN SERPL-MCNC: 23.5 MG/DL (ref 9.8–20.1)
CALCIUM SERPL-MCNC: 8.6 MG/DL (ref 8.4–10.2)
CHLORIDE SERPL-SCNC: 107 MMOL/L (ref 98–107)
CO2 SERPL-SCNC: 23 MMOL/L (ref 23–31)
CREAT SERPL-MCNC: 0.91 MG/DL (ref 0.55–1.02)
CREAT/UREA NIT SERPL: 26
EOSINOPHIL # BLD AUTO: 0.01 X10(3)/MCL (ref 0–0.9)
EOSINOPHIL NFR BLD AUTO: 0.1 %
ERYTHROCYTE [DISTWIDTH] IN BLOOD BY AUTOMATED COUNT: 12.8 % (ref 11.5–17)
GFR SERPLBLD CREATININE-BSD FMLA CKD-EPI: >60 ML/MIN/1.73/M2
GLOBULIN SER-MCNC: 3.4 GM/DL (ref 2.4–3.5)
GLUCOSE SERPL-MCNC: 117 MG/DL (ref 82–115)
HCT VFR BLD AUTO: 42 % (ref 37–47)
HGB BLD-MCNC: 13.6 G/DL (ref 12–16)
IMM GRANULOCYTES # BLD AUTO: 0.11 X10(3)/MCL (ref 0–0.04)
IMM GRANULOCYTES NFR BLD AUTO: 1.3 %
LYMPHOCYTES # BLD AUTO: 1.66 X10(3)/MCL (ref 0.6–4.6)
LYMPHOCYTES NFR BLD AUTO: 19.6 %
MCH RBC QN AUTO: 29.2 PG (ref 27–31)
MCHC RBC AUTO-ENTMCNC: 32.4 G/DL (ref 33–36)
MCV RBC AUTO: 90.3 FL (ref 80–94)
MONOCYTES # BLD AUTO: 0.47 X10(3)/MCL (ref 0.1–1.3)
MONOCYTES NFR BLD AUTO: 5.6 %
NEUTROPHILS # BLD AUTO: 6.19 X10(3)/MCL (ref 2.1–9.2)
NEUTROPHILS NFR BLD AUTO: 73.2 %
PLATELET # BLD AUTO: 289 X10(3)/MCL (ref 130–400)
PMV BLD AUTO: 9 FL (ref 7.4–10.4)
POTASSIUM SERPL-SCNC: 4.7 MMOL/L (ref 3.5–5.1)
PROT SERPL-MCNC: 6.8 GM/DL (ref 5.8–7.6)
RBC # BLD AUTO: 4.65 X10(6)/MCL (ref 4.2–5.4)
SODIUM SERPL-SCNC: 138 MMOL/L (ref 136–145)
WBC # BLD AUTO: 8.46 X10(3)/MCL (ref 4.5–11.5)

## 2025-01-17 PROCEDURE — 80053 COMPREHEN METABOLIC PANEL: CPT

## 2025-01-17 PROCEDURE — 85025 COMPLETE CBC W/AUTO DIFF WBC: CPT

## 2025-01-17 PROCEDURE — 36415 COLL VENOUS BLD VENIPUNCTURE: CPT

## 2025-02-10 DIAGNOSIS — Z78.0 MENOPAUSE: Primary | ICD-10-CM

## 2025-02-10 NOTE — PROGRESS NOTES
Subjective:       Patient ID: Candace Macias is a 74 y.o. female.    Surgeons: Dr. Rambo Cassidy/Dr. Delonte Stevens  PCP: Dr. Jamari Larsen    Right Breast Cancer stage IIA (T2N0M0) diagnosed 18  Biopsy/pathology: Right breast 10:00 core biopsy done 18--invasive ductal carcinoma, high grade, measuring at least 0.7cm, DCIS minor component solid pattern, nuclear grade 3/3 without necrosis, ER 98%, CT 38%, Her 2 Equivocal 2+ by IHC, non-amplified by FISH, Ki67 10.5%.  Surgery/pathology: Bilateral mastectomies with reconstruction done 18--invasive ductal carcinoma measures 2.3cm, grade 3, margins clear, associated DCIS high grade, 2 lymph nodes negative. Oncotype DX testing done showed tumor high risk with score of 33, risk of 9-year distant recurrence 21% with >15% benefit from chemotherapy.  Imagin. Screening MMG/US done 18 [facility]--1cm lobulated mass right breast UOQ on MMG, two hypoechoic masses right breast UOQ, more superficial mass measures 0.7cm, second mass more posterior and measures 0.7cm, BIRADS 0 incomplete.  2. Right breast and axillary US 18 [facility]--2 masses along the 10:00 position right breast, one measures 0.9cm with microlobulated and angular margins, another more posterior measures 1.2cm, two masses are 0.7cm apart with longest length 2.1cm, both are highly suspicious for malignancy suggesting multifocal invasive carcinoma.    DEXA:  02/23/15 Dr. Sanders's office AP spine T= -1.33, Femoral neck -1.8, total left hip -1.09 c/w osteopenia.  19 [facility] AP spine T= -2.5, Femur neck left -1.3, right -2.1, total hip left -1.5, right -1.8 c/w osteoporosis in spine.  21 [facility] AP spine -1.5, Right femoral neck -1.4, Total right -1.1, Left radius -1.0. Consistent with improvement.     Treatment history:  Bilateral mastectomies with reconstruction 18.  Reconstruction revision 5/15/19.  Treatment recommended with Taxotere/Cytoxan X 4 cycles but decided  against treatment    Current treatment plan:  1. Femara started 2/12/19--plan for 10 years  2. Prolia started 8/13/19 for osteoporosis--next dose due May 2025.      Chief Complaint: Other Misc (Pt reports no concerns today./)    HPI  Patient is here for follow-up of breast cancer. She is feeling great. She continues on Femara and is doing well. Her right lower extremity is still swollen from her hip surgery (nerve injury) and has limited ROM but she is able to drive now. Her DEXA was rescheduled because the machine is down at this time. Otherwise no problems reported.     Past Medical History:   Diagnosis Date    A-fib     Asthma     Breast cancer     Fibromyalgia     Hypertension     Osteoarthritis       Review of patient's allergies indicates:   Allergen Reactions    Loratadine-pseudoephedrine Rash and Shortness Of Breath     Other reaction(s): hives, pruritis        Current Outpatient Medications:     amitriptyline (ELAVIL) 50 MG tablet, Take 50 mg by mouth nightly., Disp: , Rfl:     ascorbic acid, vitamin C, (VITAMIN C) 1000 MG tablet, Take 1,000 mg by mouth once daily., Disp: , Rfl:     bisacodyL (DULCOLAX) 5 mg EC tablet, Take 10 mg by mouth daily as needed., Disp: , Rfl:     denosumab (PROLIA) 60 mg/mL Syrg, Inject 60 mg into the skin every 6 (six) months., Disp: , Rfl:     furosemide (LASIX) 20 MG tablet, Take 20 mg by mouth as needed., Disp: , Rfl:     letrozole (FEMARA) 2.5 mg Tab, TAKE 1 TABLET BY MOUTH EVERY DAY, Disp: 90 tablet, Rfl: 2    metoprolol succinate (TOPROL-XL) 25 MG 24 hr tablet, Take 25 mg by mouth every evening., Disp: , Rfl:     pregabalin (LYRICA) 50 MG capsule, Take 50 mg by mouth every evening., Disp: , Rfl:     rOPINIRole (REQUIP) 1 MG tablet, Take 2 mg by mouth nightly., Disp: , Rfl:     tiZANidine (ZANAFLEX) 4 MG tablet, Take 4 mg by mouth nightly as needed., Disp: , Rfl:     traMADoL (ULTRAM) 50 mg tablet, Take 1 tablet (50 mg total) by mouth every 4 (four) hours as needed (as  needed for pain score 1-5)., Disp: 28 tablet, Rfl: 0    vitamin D (VITAMIN D3) 1000 units Tab, Take 1,000 Units by mouth once daily., Disp: , Rfl:     vitamin E 400 UNIT capsule, Take 400 Units by mouth., Disp: , Rfl:     XARELTO 20 mg Tab, Take 20 mg by mouth once daily. Last dose to be on Sunday July 31st., Disp: , Rfl:   No current facility-administered medications for this visit.    Facility-Administered Medications Ordered in Other Visits:     betamethasone acetate-betamethasone sodium phosphate injection 12 mg, 12 mg, Intramuscular, , Osmany Kwon MD, 12 mg at 01/25/24 0900    LIDOcaine (PF) 20 mg/mL (2%) injection 5 mL, 5 mL, , , Osmany Kwon MD, 5 mL at 01/25/24 0900  Review of Systems   Constitutional:  Negative for activity change, fever and unexpected weight change.   Eyes:  Negative for visual disturbance.   Respiratory:  Negative for cough and shortness of breath.    Cardiovascular:  Negative for chest pain.   Gastrointestinal:  Negative for abdominal pain, blood in stool, constipation, diarrhea, nausea and vomiting.   Genitourinary:  Negative for difficulty urinating.   Musculoskeletal:  Negative for back pain.        S/p right hip surgery   Integumentary:  Negative for rash.   Neurological:  Negative for dizziness, weakness and headaches.        Numbness and limited ROM to right lower extremity post surgery   Psychiatric/Behavioral:  Negative for behavioral problems and suicidal ideas.          Vitals:    02/18/25 0915   BP: (!) 142/82   Pulse: 73   Resp: 14   Temp: 98.3 °F (36.8 °C)     Wt Readings from Last 3 Encounters:   02/18/25 0915 106.7 kg (235 lb 3.2 oz)   01/25/24 0932 103.4 kg (228 lb)   01/22/24 1109 102.9 kg (226 lb 14.4 oz)      Physical Exam  Vitals reviewed.   Constitutional:       Appearance: Normal appearance. She is obese.   HENT:      Head: Normocephalic.   Eyes:      General: Lids are normal. Vision grossly intact.      Extraocular Movements: Extraocular movements  intact.      Conjunctiva/sclera: Conjunctivae normal.   Cardiovascular:      Rate and Rhythm: Normal rate and regular rhythm.      Pulses: Normal pulses.      Heart sounds: Normal heart sounds, S1 normal and S2 normal.   Pulmonary:      Effort: Pulmonary effort is normal.      Breath sounds: Normal breath sounds.   Chest:      Comments: S/p bilateral nipple-sparing mastectomies with reconstruction, no masses, no palpable adenopathy.  Abdominal:      General: Abdomen is protuberant. Bowel sounds are normal.      Palpations: Abdomen is soft.   Musculoskeletal:      Cervical back: Normal range of motion and neck supple.      Right lower le+ Edema present.      Comments: Right foot drop   Skin:     General: Skin is warm and moist.      Capillary Refill: Capillary refill takes less than 2 seconds.   Neurological:      General: No focal deficit present.      Mental Status: She is alert and oriented to person, place, and time.   Psychiatric:         Attention and Perception: Attention normal.         Mood and Affect: Mood normal.         Speech: Speech normal.         Behavior: Behavior normal. Behavior is cooperative.         Cognition and Memory: Cognition normal.         Judgment: Judgment normal.       ECOG SCORE    1 - Restricted in strenuous activity-ambulatory and able to carry out work of a light nature       No visits with results within 1 Week(s) from this visit.   Latest known visit with results is:   Lab Visit on 2025   Component Date Value    Sodium 2025 138     Potassium 2025 4.7     Chloride 2025 107     CO2 2025 23     Glucose 2025 117 (H)     Blood Urea Nitrogen 2025 23.5 (H)     Creatinine 2025 0.91     Calcium 2025 8.6     Protein Total 2025 6.8     Albumin 2025 3.4     Globulin 2025 3.4     Albumin/Globulin Ratio 2025 1.0 (L)     Bilirubin Total 2025 0.2     ALP 2025 65     ALT 2025 9     AST 2025  11     eGFR 01/17/2025 >60     Anion Gap 01/17/2025 8.0     BUN/Creatinine Ratio 01/17/2025 26     WBC 01/17/2025 8.46     RBC 01/17/2025 4.65     Hgb 01/17/2025 13.6     Hct 01/17/2025 42.0     MCV 01/17/2025 90.3     MCH 01/17/2025 29.2     MCHC 01/17/2025 32.4 (L)     RDW 01/17/2025 12.8     Platelet 01/17/2025 289     MPV 01/17/2025 9.0     Neut % 01/17/2025 73.2     Lymph % 01/17/2025 19.6     Mono % 01/17/2025 5.6     Eos % 01/17/2025 0.1     Basophil % 01/17/2025 0.2     Imm Grans % 01/17/2025 1.3     Neut # 01/17/2025 6.19     Lymph # 01/17/2025 1.66     Mono # 01/17/2025 0.47     Eos # 01/17/2025 0.01     Baso # 01/17/2025 0.02     Imm Gran # 01/17/2025 0.11 (H)           Assessment:       Problem List Items Addressed This Visit       Osteopenia of spine    Relevant Orders    Comprehensive Metabolic Panel    CBC Auto Differential    Malignant neoplasm of upper-outer quadrant of right breast in female, estrogen receptor positive - Primary    Relevant Orders    Comprehensive Metabolic Panel    CBC Auto Differential     Other Visit Diagnoses         Encounter for screening mammogram for breast cancer        Relevant Orders    Mammo Digital Screening Bilat w/ Gavin (XPD)                 Plan:       Patient with stage IIA right breast cancer s/p bilateral mastectomies with reconstruction done 11/13/18 in [place] with Dr. Rambo Cassidy. Tumor measured 2.3cm, ER and OH strongly positive and Her2 negative with negative sentinel lymph nodes.  Per NCCN guidelines, Oncotype DX testing is recommended.  Patient was told by her surgeon that she would not need chemotherapy and I am not sure why this happened and she is upset. I explained decision making in detail, went over NCCN guidelines recommending oncotype DX testing.  Oncotype DX score returned 33, high risk. Explained that risk of recurrence with AI alone is 21% and that chemotherapy will add benefit of >15%.  I recommended treatment with Taxotere/Cytoxan X  4 cycles in addition to AI.  Patient ultimately decided against chemotherapy.    Currently patient is doing well without any signs or symptoms to suggest disease recurrence.  Femara started 2/12/19 and she is tolerating well.  DEXA from 8/2/19 showed osteoporosis in spine and osteopenia in hips. Prolia started every 6 months on 8/13/19. Repeat DEXA on 9/13/21 showed significant improvement.  She is overdue for repeat DEXA. Orders have been in chart. She was scheduled earlier this week but now the machine is down. They will contact her when fixed.   Continue Prolia. Next dose due May 2025.   Continue Femara.  Continue calcium and vitamin D.   Recent labs all good.   Continue with annual surveillance appointments since she is now >5 years from diagnosis.  Since she has nipple-sparing mastectomies, she can have annual screening MMGs per NCCN guidelines. Will send orders to [facility].   All questions answered at this time.        Karla Cifuentes, FNPC    Visit today included increased complexity associated with the care of the episodic side effects from the AI therapy, also addressed and managed the longitudinal care of the patient's breast cancer.

## 2025-02-18 ENCOUNTER — OFFICE VISIT (OUTPATIENT)
Dept: HEMATOLOGY/ONCOLOGY | Facility: CLINIC | Age: 75
End: 2025-02-18
Payer: MEDICARE

## 2025-02-18 VITALS
OXYGEN SATURATION: 94 % | TEMPERATURE: 98 F | HEART RATE: 73 BPM | RESPIRATION RATE: 14 BRPM | HEIGHT: 69 IN | WEIGHT: 235.19 LBS | BODY MASS INDEX: 34.83 KG/M2 | DIASTOLIC BLOOD PRESSURE: 82 MMHG | SYSTOLIC BLOOD PRESSURE: 142 MMHG

## 2025-02-18 DIAGNOSIS — Z17.0 MALIGNANT NEOPLASM OF UPPER-OUTER QUADRANT OF RIGHT BREAST IN FEMALE, ESTROGEN RECEPTOR POSITIVE: Primary | ICD-10-CM

## 2025-02-18 DIAGNOSIS — C50.411 MALIGNANT NEOPLASM OF UPPER-OUTER QUADRANT OF RIGHT BREAST IN FEMALE, ESTROGEN RECEPTOR POSITIVE: Primary | ICD-10-CM

## 2025-02-18 DIAGNOSIS — Z12.31 ENCOUNTER FOR SCREENING MAMMOGRAM FOR BREAST CANCER: ICD-10-CM

## 2025-02-18 DIAGNOSIS — M85.88 OSTEOPENIA OF SPINE: ICD-10-CM

## 2025-02-18 PROCEDURE — 99215 OFFICE O/P EST HI 40 MIN: CPT | Mod: PBBFAC | Performed by: NURSE PRACTITIONER

## 2025-02-18 RX ORDER — FUROSEMIDE 20 MG/1
20 TABLET ORAL
COMMUNITY

## 2025-02-18 RX ORDER — DENOSUMAB 60 MG/ML
60 INJECTION SUBCUTANEOUS
COMMUNITY

## 2025-03-30 DIAGNOSIS — M85.89 OTHER SPECIFIED DISORDERS OF BONE DENSITY AND STRUCTURE, MULTIPLE SITES: ICD-10-CM

## 2025-03-30 DIAGNOSIS — C50.411 MALIGNANT NEOPLASM OF UPPER-OUTER QUADRANT OF RIGHT FEMALE BREAST: ICD-10-CM

## 2025-03-31 RX ORDER — LETROZOLE 2.5 MG/1
2.5 TABLET, FILM COATED ORAL
Qty: 90 TABLET | Refills: 3 | Status: SHIPPED | OUTPATIENT
Start: 2025-03-31

## 2025-05-12 ENCOUNTER — INFUSION (OUTPATIENT)
Dept: INFUSION THERAPY | Facility: HOSPITAL | Age: 75
End: 2025-05-12
Attending: INTERNAL MEDICINE
Payer: MEDICARE

## 2025-05-12 VITALS
HEIGHT: 69 IN | RESPIRATION RATE: 18 BRPM | SYSTOLIC BLOOD PRESSURE: 136 MMHG | BODY MASS INDEX: 34.83 KG/M2 | WEIGHT: 235.19 LBS | HEART RATE: 70 BPM | DIASTOLIC BLOOD PRESSURE: 75 MMHG | OXYGEN SATURATION: 99 % | TEMPERATURE: 98 F

## 2025-05-12 DIAGNOSIS — M85.88 OSTEOPENIA OF SPINE: Primary | ICD-10-CM

## 2025-05-12 PROCEDURE — 63600175 PHARM REV CODE 636 W HCPCS: Mod: JZ,TB | Performed by: NURSE PRACTITIONER

## 2025-05-12 PROCEDURE — 96372 THER/PROPH/DIAG INJ SC/IM: CPT

## 2025-05-12 RX ADMIN — DENOSUMAB 60 MG: 60 INJECTION SUBCUTANEOUS at 01:05

## 2025-05-12 NOTE — NURSING
Prolia given and tolerated well; reviewed future appointments with patient, discharged in stable condition.

## 2025-05-30 NOTE — PLAN OF CARE
Faxed dc summary & avs to [facility].    HISTORY:  Mrs. Johnson is a 64 years old female seen in the emergency room at the request of Dr. Mayer for evaluation of abnormal liver enzymes and abdominal pain.  Patient was in her usual state until this morning when she was taking her usual walk and suddenly she developed right abdominal pain wrapping around her upper abdomen associate with nausea but no vomiting.  Pain was very severe and relieved by passing some gas but did not disappear.  Patient came to the emergency room.  An abdominal ultrasound showed cholelithiasis but no clear evidence of cholecystitis.  Liver enzymes were elevated slightly.  10 days ago her liver enzymes were completely normal.  Today her AST is 168 ALT is 120 and alkaline phosphatase 133.  Bilirubin is 1.2.  At the time of the interview patient states that she is pain-free and she is feeling much better without receiving any pain medications.  There is no diarrhea.  No nausea or vomiting.  No sick contacts.  Denies any upper respiratory symptoms.    Past Medical History:   Diagnosis Date    Benign cyst of right breast 06/2016    Multiple benign right breast cyst. No suspicious sonographic masses.    Endometriosis, site unspecified     Endometriosis    Family history of abdominal aortic aneurysm 03/20/2018    Functional diarrhea 01/11/2006    colonoscopy/    Ganglion Just Proximal R Great Toe Bunion (~8t5x5ls) 10/24/2013    Hyperlipidemia 01/18/2017    Irritable bowel     diarrhea/ nausea ; now constipation     Osteopenia of lumbar spine 10/01/2020    Presbyopia 09/16/2004    ROTATOR CUFF SYND NOS-L 03/25/2009    does hair for living, has had cortisone injection     Past Surgical History:   Procedure Laterality Date    Breast cyst aspiration Right 2016    Colonoscopy w biopsy  01/11/2006    biopsies normal/  next 2016    Esophagogastroduodenoscopy transoral flex w/bx single or mult  09/22/2008    Dr Richardson/    Flexible sigmoidoscopy bx  11/01/2000     Laparoscopy,lysis adhesns  11/01/2000    Lysis of adhesion of bowel to vaginal cuff.    Open access colonoscopy  04/07/2016    Colonoscopy, Screen; next due in 10 yrs     Pelvis laparoscopy,dx  01/01/1986    Laparoscopy- endometreposis    Removal of tonsils,12+ y/o  01/01/1995    Us guided brst lesion asp/bx/wire loc Right 06/05/2014    Breast Asp, US Guided; twice done    Vaginal hysterectomy  01/01/2000    hysterectomy/LAVH       ALLERGIES:   Allergen Reactions    Scopace [Scopolamine] THROAT SWELLING     Suspect too much from patch at once.  Withdrawal after patch treatment. Only 2 patches.        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    REVIEW OF SYSTEMS:  Complete review of system was done which was all negative except for what is mentioned in the HPI    FAMILY MEDICAL HISTORY:  Noncontributory to any major gastrointestinal problems    PHYSICAL EXAMINATION:  Vitals:    05/30/25 1305   BP: (!) 141/74   Pulse: 86   Resp: 18   Temp: 98.7 °F (37.1 °C)     GENERAL: Alert, oriented x 3, not in distress  SKIN: Warm and dry with no discoloration  HEENT: Normocephalic atraumatic, pupils are equal and reactive, not icteric sclera  LYMPH NODES: No neck or axillary lymphadenopathy  CHEST: Clear to auscultation and percussion  HEART: Regular rate and rhythm, no gallops  ABDOMEN: Soft, minimal or no tenderness in the upper abdomen.  No rebound tenderness, no guarding and no rigidity, no organomegaly, bowel sounds are present  EXTREMITIES: No edema, pulses are equal bilaterally  NEUROLOGIC: No focal sensorimotor deficit  RECTAL: Not done    ANCILLARY DATA: Ultrasound and liver enzyme results noted as described above    ASSESSMENT AND PLAN:  Upper abdominal pain most specifically in the right upper quadrant region associated with abnormal liver enzymes and ultrasound showing cholelithiasis.  No clear evidence of  cholecystitis on ultrasound and physical exam demonstrated very mild tenderness but there is no rebound tenderness and no guarding.  I had a lengthy discussion with the patient regarding the findings and the implications.  She was advised that she will need cholecystectomy as this is symptomatic cholelithiasis.  There is no indication or plan for ERCP.  LFTs could be trended and if they are rising then ERCP could be considered.  Patient states that since she is pain-free now and she has improved significantly since morning she prefers the management take place as outpatient and she prefers to see a surgeon as outpatient.  She was advised very strongly however if she should go home to report back to the emergency room immediately if she should have more symptoms as always there is a risk of biliary pancreatitis or cholangitis related to stone migrating in the bile duct.  Patient expressed her understanding and she will do so if the pain should come back or get worse.    Thank you for allowing me to participate in this patient's care.

## (undated) DEVICE — APPLICATOR CHLORAPREP ORN 26ML

## (undated) DEVICE — TAPE ADH MEDIPORE 4 X 10YDS

## (undated) DEVICE — PAD ABD 8X10 STERILE

## (undated) DEVICE — DRAPE SURG W/TWL 17 5/8X23

## (undated) DEVICE — COVER HD BACK TABLE 6FT

## (undated) DEVICE — SOL NACL IRR 3000ML

## (undated) DEVICE — GLOVE 7.0 PROTEXIS PI BLUE

## (undated) DEVICE — GLOVE PROTEXIS HYDROGEL SZ6.5

## (undated) DEVICE — SUT BLU BR 2 TAPERD NDL 1/2

## (undated) DEVICE — GLOVE PROTEXIS HYDROGEL SZ8.5

## (undated) DEVICE — GAUZE SPONGE 4X4 12PLY

## (undated) DEVICE — PIN BONE 4 X 140MM STERILE
Type: IMPLANTABLE DEVICE | Site: HIP | Status: NON-FUNCTIONAL
Removed: 2022-08-03

## (undated) DEVICE — SYS CLSR DERMABOND PRINEO 22CM

## (undated) DEVICE — DRAPE MEDIUM SHEET 40X70IN

## (undated) DEVICE — KIT SURGICAL TURNOVER

## (undated) DEVICE — DRAPE FULL SHEET 70X100IN

## (undated) DEVICE — DEVICE STRATAFIX SYMMETRIC +

## (undated) DEVICE — Device

## (undated) DEVICE — DRAPE STERI U-SHAPED 47X51IN

## (undated) DEVICE — KIT CHECKPOINT TIBIAL

## (undated) DEVICE — KIT DRAPE RIO ONE PIECE W/POCK

## (undated) DEVICE — SUT MONO 2-0 CT-1 VIL

## (undated) DEVICE — KIT TRACKING VIZADISC HIP

## (undated) DEVICE — ELECTRODE PATIENT RETURN DISP

## (undated) DEVICE — STAPLER SKIN PROXIMATE WIDE

## (undated) DEVICE — DRESSING XEROFORM FOIL PK 1X8

## (undated) DEVICE — GOWN POLY REINF X-LONG 2XL

## (undated) DEVICE — BLADE SAG DUAL CUT 18X90X1.35

## (undated) DEVICE — DRAPE INCISE IOBAN 2 23X23IN

## (undated) DEVICE — RETRIEVER SUTURE HEWSON DISP

## (undated) DEVICE — NDL ANES SPINAL 18X3.5ST 18G

## (undated) DEVICE — GLOVE PROTEXIS BLUE LATEX 8.5

## (undated) DEVICE — KIT TOTAL HIP HLGC

## (undated) DEVICE — SUT MONOCRYL 3-0 PS-2 UND

## (undated) DEVICE — SOL NACL IRR 1000ML BTL